# Patient Record
Sex: FEMALE | Race: WHITE | NOT HISPANIC OR LATINO | Employment: FULL TIME | URBAN - METROPOLITAN AREA
[De-identification: names, ages, dates, MRNs, and addresses within clinical notes are randomized per-mention and may not be internally consistent; named-entity substitution may affect disease eponyms.]

---

## 2023-01-30 PROBLEM — E55.9 VITAMIN D INSUFFICIENCY: Status: ACTIVE | Noted: 2023-01-30

## 2023-01-30 PROBLEM — F32.A ANXIETY AND DEPRESSION: Status: ACTIVE | Noted: 2023-01-30

## 2023-01-30 PROBLEM — M54.2 CERVICAL SPINE PAIN: Status: ACTIVE | Noted: 2023-01-30

## 2023-01-30 PROBLEM — L25.9 CONTACT DERMATITIS: Status: ACTIVE | Noted: 2023-01-30

## 2023-01-30 PROBLEM — Z86.19 HISTORY OF SHINGLES: Status: ACTIVE | Noted: 2023-01-30

## 2023-01-30 PROBLEM — J30.2 SEASONAL ALLERGIES: Status: ACTIVE | Noted: 2023-01-30

## 2023-01-30 PROBLEM — F41.9 ANXIETY AND DEPRESSION: Status: ACTIVE | Noted: 2023-01-30

## 2023-01-30 PROBLEM — R31.21 ASYMPTOMATIC MICROSCOPIC HEMATURIA: Status: ACTIVE | Noted: 2023-01-30

## 2023-01-30 PROBLEM — R03.0 BLOOD PRESSURE ELEVATED WITHOUT HISTORY OF HTN: Status: ACTIVE | Noted: 2023-01-30

## 2023-01-30 PROBLEM — I49.3 PVC (PREMATURE VENTRICULAR CONTRACTION): Status: ACTIVE | Noted: 2023-01-30

## 2023-01-30 PROBLEM — J45.20 MILD INTERMITTENT ASTHMA (HHS-HCC): Status: ACTIVE | Noted: 2023-01-30

## 2023-01-30 PROBLEM — F51.04 CHRONIC INSOMNIA: Status: ACTIVE | Noted: 2023-01-30

## 2023-01-30 PROBLEM — G35 MULTIPLE SCLEROSIS (MULTI): Status: ACTIVE | Noted: 2023-01-30

## 2023-01-30 PROBLEM — M47.816 LUMBAR SPONDYLOSIS: Status: ACTIVE | Noted: 2023-01-30

## 2023-01-30 PROBLEM — B37.2 CANDIDAL INTERTRIGO: Status: ACTIVE | Noted: 2023-01-30

## 2023-01-30 PROBLEM — R32 URINARY INCONTINENCE: Status: ACTIVE | Noted: 2023-01-30

## 2023-01-30 RX ORDER — ALBUTEROL SULFATE 90 UG/1
AEROSOL, METERED RESPIRATORY (INHALATION)
COMMUNITY
Start: 2022-10-11

## 2023-01-30 RX ORDER — BACLOFEN 10 MG/1
10 TABLET ORAL 3 TIMES DAILY PRN
COMMUNITY
Start: 2021-08-09 | End: 2023-03-06 | Stop reason: ALTCHOICE

## 2023-01-30 RX ORDER — ASPIRIN 325 MG
50000 TABLET, DELAYED RELEASE (ENTERIC COATED) ORAL
COMMUNITY
Start: 2022-09-02 | End: 2023-07-28 | Stop reason: SDUPTHER

## 2023-01-30 RX ORDER — LORAZEPAM 1 MG/1
1 TABLET ORAL 3 TIMES DAILY PRN
COMMUNITY
Start: 2021-07-07 | End: 2023-03-06 | Stop reason: SDUPTHER

## 2023-01-30 RX ORDER — FLUTICASONE PROPIONATE 50 MCG
1 SPRAY, SUSPENSION (ML) NASAL DAILY
COMMUNITY
Start: 2022-12-08 | End: 2023-07-28 | Stop reason: ALTCHOICE

## 2023-01-30 RX ORDER — ZOSTER VACCINE RECOMBINANT, ADJUVANTED 50 MCG/0.5
KIT INTRAMUSCULAR
COMMUNITY
End: 2023-02-28 | Stop reason: ENTERED-IN-ERROR

## 2023-01-30 RX ORDER — DULOXETINE 40 MG/1
40 CAPSULE, DELAYED RELEASE ORAL DAILY
COMMUNITY
Start: 2021-11-17 | End: 2023-07-28 | Stop reason: SDUPTHER

## 2023-01-30 RX ORDER — CALCIUM CARBONATE/VITAMIN D3 500-10/5ML
400 LIQUID (ML) ORAL DAILY
COMMUNITY
End: 2023-07-28 | Stop reason: ALTCHOICE

## 2023-01-30 RX ORDER — OCRELIZUMAB 300 MG/10ML
300 INJECTION INTRAVENOUS
COMMUNITY
Start: 2021-12-16

## 2023-02-05 PROBLEM — Z87.42 H/O ABNORMAL CERVICAL PAPANICOLAOU SMEAR: Status: ACTIVE | Noted: 2023-02-05

## 2023-02-05 PROBLEM — Z00.00 ROUTINE HEALTH MAINTENANCE: Status: ACTIVE | Noted: 2023-02-05

## 2023-02-05 PROBLEM — Z79.899 MEDICATION MANAGEMENT: Status: ACTIVE | Noted: 2023-02-05

## 2023-03-06 ENCOUNTER — TELEMEDICINE (OUTPATIENT)
Dept: PRIMARY CARE | Facility: CLINIC | Age: 38
End: 2023-03-06
Payer: COMMERCIAL

## 2023-03-06 DIAGNOSIS — F32.A ANXIETY AND DEPRESSION: Primary | ICD-10-CM

## 2023-03-06 DIAGNOSIS — E55.9 VITAMIN D INSUFFICIENCY: ICD-10-CM

## 2023-03-06 DIAGNOSIS — G35 MULTIPLE SCLEROSIS (MULTI): ICD-10-CM

## 2023-03-06 DIAGNOSIS — F41.9 ANXIETY AND DEPRESSION: Primary | ICD-10-CM

## 2023-03-06 DIAGNOSIS — F51.04 CHRONIC INSOMNIA: ICD-10-CM

## 2023-03-06 DIAGNOSIS — Z00.00 ROUTINE GENERAL MEDICAL EXAMINATION AT A HEALTH CARE FACILITY: ICD-10-CM

## 2023-03-06 DIAGNOSIS — Z79.899 MEDICATION MANAGEMENT: ICD-10-CM

## 2023-03-06 PROCEDURE — 99214 OFFICE O/P EST MOD 30 MIN: CPT | Performed by: INTERNAL MEDICINE

## 2023-03-06 RX ORDER — LORAZEPAM 1 MG/1
1 TABLET ORAL 3 TIMES DAILY PRN
Qty: 90 TABLET | Refills: 1 | Status: SHIPPED | OUTPATIENT
Start: 2023-03-06 | End: 2023-05-22 | Stop reason: SDUPTHER

## 2023-03-06 NOTE — PROGRESS NOTES
ES  Ran out of medication a week ago and was taking lower dose cymbalta ([facility] was shut off) last 2 days  Stress at home,  with rohith hatfield  Sleep has been nightmares  Wasn't working out consistently recently  Thought maybe shingles was coming back      Controlled Substance Visit:  I have personally reviewed the OARRS report and have considered the risks of abuse, dependence, addiction and diversion and I believe that it is clinically appropriate for the patient to be prescribed this medication.    Is the patient prescribed a combination of a benzodiazepine and opioid?  No      The last Urine Drug Screen has been reviewed and results are as expected,  and/or the UDS was ordered today if due. (06/23/2022)    Controlled Substance Agreement Date 03/06/2023  I have reviewed each line item on the Controlled Substance Agreement including, but not limited to, the benefits, risks, and alternatives to treatment with a Controlled Substance medication(s). The patient has verbalized understanding and signed the agreement.    BENZODIAZEPINES   BRIANNE -7  What is the patient’s goal of therapy? tx anxiety.  Is this being achieved with current treatment? yes.   BRIANNE-7   1. Feeling nervous, anxious or on edge- not at all  2. Not being able to stop or control worrying - several days  3. Worrying too much about different things - several days  4. Trouble relaxing - several days  5. Being so restless that it is hard to sit still - not at all  6. Becoming easily annoyed or irritable - not at all  7. Feeling afraid as if something awful might happen - not at all  Total Score = 3    Activities of Daily Living:   Is your overall impression that this patient is benefiting (symptom reduction outweighs side effects) from benzodiazepine therapy? Yes     1. Physical Functioning: Same  2. Family Relationship: Same  3. Social Relationship: Same  4. Mood: Worse  5. Sleep Patterns: Worse  6. Overall Function: Same    ROS otherwise negative aside  from what was mentioned above in HPI.    Physical Exam  Gen: Alert, NAD  HEENT:  PERRLA, EOMI, conjunctiva and sclera normal in appearance. External auditory canals/TMs normal;; Oral cavity and posterior pharynx without lesions/exudate    Problem List Items Addressed This Visit       Anxiety and depression - Primary    Overview     Started 6/2021 after dx with MS  Genesight testing 8/21: Zoloft causes moderate gene-drug interactions, Wellbutrin and Selegiline cause severe gene-drug interactions  Lexapro caused increased appetite  Prozac caused  burning mouth at 20mg  On Cymbalta  Using Ativan prn         Relevant Medications    LORazepam (Ativan) 1 mg tablet    Other Relevant Orders    TSH    Chronic insomnia    Overview     Ambien didnt work  Unisom helps for few hours  IV Benadryl worked  Trazodone may be interacting with her SSRI, stopped 9/21  Using Ativan at HS         Relevant Medications    LORazepam (Ativan) 1 mg tablet    Multiple sclerosis (CMS/HCC)    Overview     dx 6/2021   Baclofen prn and Ativan for muscle spasms and dizziness associated with her disease  On Ocrevus, started in August 2021.  [facility] manages         Relevant Orders    Magnesium    Vitamin B12    Vitamin D insufficiency    Overview     Goal ~50  On Vit D 6K daily, prefers to take weekly bc forgets daily         Relevant Orders    Vitamin D 25 hydroxy    Magnesium    Vitamin B12    Medication management    Overview     UDS, signed controlled substance contract, and OARRS check all up to date          Other Visit Diagnoses       Routine general medical examination at a health care facility        Relevant Orders    Comprehensive Metabolic Panel    CBC and Auto Differential    Lipid Panel    Vitamin D 25 hydroxy

## 2023-03-08 PROBLEM — Z00.00 ROUTINE ADULT HEALTH MAINTENANCE: Status: ACTIVE | Noted: 2023-03-08

## 2023-03-08 PROBLEM — Z00.00 ROUTINE ADULT HEALTH MAINTENANCE: Chronic | Status: ACTIVE | Noted: 2023-03-08

## 2023-03-08 PROBLEM — R32 URINARY INCONTINENCE: Status: RESOLVED | Noted: 2023-01-30 | Resolved: 2023-03-08

## 2023-04-07 ENCOUNTER — APPOINTMENT (OUTPATIENT)
Dept: LAB | Facility: LAB | Age: 38
End: 2023-04-07
Payer: COMMERCIAL

## 2023-04-07 ENCOUNTER — OFFICE VISIT (OUTPATIENT)
Dept: PRIMARY CARE | Facility: CLINIC | Age: 38
End: 2023-04-07
Payer: COMMERCIAL

## 2023-04-07 VITALS
SYSTOLIC BLOOD PRESSURE: 135 MMHG | TEMPERATURE: 97.5 F | DIASTOLIC BLOOD PRESSURE: 88 MMHG | HEART RATE: 78 BPM | WEIGHT: 193.6 LBS | BODY MASS INDEX: 28.68 KG/M2 | OXYGEN SATURATION: 98 % | HEIGHT: 69 IN

## 2023-04-07 DIAGNOSIS — R09.89 TENDER LYMPH NODE: ICD-10-CM

## 2023-04-07 DIAGNOSIS — Z00.00 ROUTINE ADULT HEALTH MAINTENANCE: Chronic | ICD-10-CM

## 2023-04-07 DIAGNOSIS — E55.9 VITAMIN D DEFICIENCY: ICD-10-CM

## 2023-04-07 DIAGNOSIS — K06.8 GINGIVAL BLEEDING: ICD-10-CM

## 2023-04-07 DIAGNOSIS — R79.89 ABNORMAL CBC: ICD-10-CM

## 2023-04-07 DIAGNOSIS — Z79.899 MEDICATION MANAGEMENT: ICD-10-CM

## 2023-04-07 DIAGNOSIS — R53.83 OTHER FATIGUE: ICD-10-CM

## 2023-04-07 DIAGNOSIS — K14.0 ABSCESS OF TONGUE: Primary | ICD-10-CM

## 2023-04-07 LAB
ALANINE AMINOTRANSFERASE (SGPT) (U/L) IN SER/PLAS: 11 U/L (ref 7–45)
ALBUMIN (G/DL) IN SER/PLAS: 4.4 G/DL (ref 3.4–5)
ALKALINE PHOSPHATASE (U/L) IN SER/PLAS: 88 U/L (ref 33–110)
ANION GAP IN SER/PLAS: 17 MMOL/L (ref 10–20)
ASPARTATE AMINOTRANSFERASE (SGOT) (U/L) IN SER/PLAS: 19 U/L (ref 9–39)
BASOPHILS (10*3/UL) IN BLOOD BY AUTOMATED COUNT: 0.09 X10E9/L (ref 0–0.1)
BASOPHILS/100 LEUKOCYTES IN BLOOD BY AUTOMATED COUNT: 2.3 % (ref 0–2)
BILIRUBIN TOTAL (MG/DL) IN SER/PLAS: 0.3 MG/DL (ref 0–1.2)
CALCIUM (MG/DL) IN SER/PLAS: 9.2 MG/DL (ref 8.6–10.3)
CARBON DIOXIDE, TOTAL (MMOL/L) IN SER/PLAS: 23 MMOL/L (ref 21–32)
CHLORIDE (MMOL/L) IN SER/PLAS: 104 MMOL/L (ref 98–107)
CHOLESTEROL (MG/DL) IN SER/PLAS: 177 MG/DL (ref 0–199)
CHOLESTEROL IN HDL (MG/DL) IN SER/PLAS: 91.9 MG/DL
CHOLESTEROL/HDL RATIO: 1.9
COBALAMIN (VITAMIN B12) (PG/ML) IN SER/PLAS: 449 PG/ML (ref 211–911)
CREATININE (MG/DL) IN SER/PLAS: 0.8 MG/DL (ref 0.5–1.05)
EBV INTERPRETATION: ABNORMAL
EOSINOPHILS (10*3/UL) IN BLOOD BY AUTOMATED COUNT: 0.15 X10E9/L (ref 0–0.7)
EOSINOPHILS/100 LEUKOCYTES IN BLOOD BY AUTOMATED COUNT: 3.8 % (ref 0–6)
EPSTEIN-BARR VCA IGG: POSITIVE
EPSTEIN-BARR VCA IGM: NEGATIVE
EPSTEIN-BARR VIRUS EARLY ANTIGEN ANTIBODY, IGG: NEGATIVE
EPSTIEN-BARR NUCLEAR ANTIGEN AB: POSITIVE
ERYTHROCYTE DISTRIBUTION WIDTH (RATIO) BY AUTOMATED COUNT: 15 % (ref 11.5–14.5)
ERYTHROCYTE MEAN CORPUSCULAR HEMOGLOBIN CONCENTRATION (G/DL) BY AUTOMATED: 33.1 G/DL (ref 32–36)
ERYTHROCYTE MEAN CORPUSCULAR VOLUME (FL) BY AUTOMATED COUNT: 88 FL (ref 80–100)
ERYTHROCYTES (10*6/UL) IN BLOOD BY AUTOMATED COUNT: 4.64 X10E12/L (ref 4–5.2)
FERRITIN (UG/LL) IN SER/PLAS: 26 UG/L (ref 8–150)
FOLATE (NG/ML) IN SER/PLAS: 12.3 NG/ML
GFR FEMALE: >90 ML/MIN/1.73M2
GLUCOSE (MG/DL) IN SER/PLAS: 82 MG/DL (ref 74–99)
HEMATOCRIT (%) IN BLOOD BY AUTOMATED COUNT: 40.8 % (ref 36–46)
HEMOGLOBIN (G/DL) IN BLOOD: 13.5 G/DL (ref 12–16)
IMMATURE GRANULOCYTES/100 LEUKOCYTES IN BLOOD BY AUTOMATED COUNT: 1.3 % (ref 0–0.9)
IRON (UG/DL) IN SER/PLAS: 27 UG/DL (ref 35–150)
IRON BINDING CAPACITY (UG/DL) IN SER/PLAS: 393 UG/DL (ref 240–445)
IRON SATURATION (%) IN SER/PLAS: 7 % (ref 25–45)
LDL: 76 MG/DL (ref 0–99)
LEUKOCYTES (10*3/UL) IN BLOOD BY AUTOMATED COUNT: 4 X10E9/L (ref 4.4–11.3)
LYMPHOCYTES (10*3/UL) IN BLOOD BY AUTOMATED COUNT: 1.99 X10E9/L (ref 1.2–4.8)
LYMPHOCYTES/100 LEUKOCYTES IN BLOOD BY AUTOMATED COUNT: 50.4 % (ref 13–44)
MAGNESIUM (MG/DL) IN SER/PLAS: 1.84 MG/DL (ref 1.6–2.4)
MONOCYTES (10*3/UL) IN BLOOD BY AUTOMATED COUNT: 1.63 X10E9/L (ref 0.1–1)
MONOCYTES/100 LEUKOCYTES IN BLOOD BY AUTOMATED COUNT: 41.3 % (ref 2–10)
NEUTROPHILS (10*3/UL) IN BLOOD BY AUTOMATED COUNT: 0.04 X10E9/L (ref 1.2–7.7)
NEUTROPHILS/100 LEUKOCYTES IN BLOOD BY AUTOMATED COUNT: 0.9 % (ref 40–80)
PLATELETS (10*3/UL) IN BLOOD AUTOMATED COUNT: 347 X10E9/L (ref 150–450)
POTASSIUM (MMOL/L) IN SER/PLAS: 4.4 MMOL/L (ref 3.5–5.3)
PROTEIN TOTAL: 7.6 G/DL (ref 6.4–8.2)
SODIUM (MMOL/L) IN SER/PLAS: 140 MMOL/L (ref 136–145)
THYROTROPIN (MIU/L) IN SER/PLAS BY DETECTION LIMIT <= 0.05 MIU/L: 1.77 MIU/L (ref 0.44–3.98)
TRIGLYCERIDE (MG/DL) IN SER/PLAS: 47 MG/DL (ref 0–149)
UREA NITROGEN (MG/DL) IN SER/PLAS: 16 MG/DL (ref 6–23)
VLDL: 9 MG/DL (ref 0–40)

## 2023-04-07 PROCEDURE — 84443 ASSAY THYROID STIM HORMONE: CPT

## 2023-04-07 PROCEDURE — 80053 COMPREHEN METABOLIC PANEL: CPT

## 2023-04-07 PROCEDURE — 82728 ASSAY OF FERRITIN: CPT

## 2023-04-07 PROCEDURE — 82746 ASSAY OF FOLIC ACID SERUM: CPT

## 2023-04-07 PROCEDURE — 80354 DRUG SCREENING FENTANYL: CPT

## 2023-04-07 PROCEDURE — 80373 DRUG SCREENING TRAMADOL: CPT

## 2023-04-07 PROCEDURE — 80346 BENZODIAZEPINES1-12: CPT

## 2023-04-07 PROCEDURE — 80368 SEDATIVE HYPNOTICS: CPT

## 2023-04-07 PROCEDURE — 1036F TOBACCO NON-USER: CPT | Performed by: NURSE PRACTITIONER

## 2023-04-07 PROCEDURE — 86665 EPSTEIN-BARR CAPSID VCA: CPT

## 2023-04-07 PROCEDURE — 83540 ASSAY OF IRON: CPT

## 2023-04-07 PROCEDURE — 80365 DRUG SCREENING OXYCODONE: CPT

## 2023-04-07 PROCEDURE — 80061 LIPID PANEL: CPT

## 2023-04-07 PROCEDURE — 80358 DRUG SCREENING METHADONE: CPT

## 2023-04-07 PROCEDURE — 83550 IRON BINDING TEST: CPT

## 2023-04-07 PROCEDURE — 80307 DRUG TEST PRSMV CHEM ANLYZR: CPT

## 2023-04-07 PROCEDURE — 80361 OPIATES 1 OR MORE: CPT

## 2023-04-07 PROCEDURE — 36415 COLL VENOUS BLD VENIPUNCTURE: CPT | Performed by: NURSE PRACTITIONER

## 2023-04-07 PROCEDURE — 83735 ASSAY OF MAGNESIUM: CPT

## 2023-04-07 PROCEDURE — 82652 VIT D 1 25-DIHYDROXY: CPT

## 2023-04-07 PROCEDURE — 85060 BLOOD SMEAR INTERPRETATION: CPT | Performed by: PATHOLOGY

## 2023-04-07 PROCEDURE — 86664 EPSTEIN-BARR NUCLEAR ANTIGEN: CPT

## 2023-04-07 PROCEDURE — 85025 COMPLETE CBC W/AUTO DIFF WBC: CPT

## 2023-04-07 PROCEDURE — 99214 OFFICE O/P EST MOD 30 MIN: CPT | Performed by: NURSE PRACTITIONER

## 2023-04-07 PROCEDURE — 82607 VITAMIN B-12: CPT

## 2023-04-07 PROCEDURE — 86663 EPSTEIN-BARR ANTIBODY: CPT

## 2023-04-07 RX ORDER — PREDNISONE 20 MG/1
40 TABLET ORAL DAILY
Qty: 6 TABLET | Refills: 0 | Status: SHIPPED | OUTPATIENT
Start: 2023-04-07 | End: 2023-04-10

## 2023-04-07 RX ORDER — AMOXICILLIN AND CLAVULANATE POTASSIUM 875; 125 MG/1; MG/1
875 TABLET, FILM COATED ORAL 2 TIMES DAILY
Qty: 14 TABLET | Refills: 0 | Status: SHIPPED | OUTPATIENT
Start: 2023-04-07 | End: 2023-04-14

## 2023-04-07 NOTE — PROGRESS NOTES
"Subjective   Patient ID: Andreina Gil is a 37 y.o. female who presents for sick visit (Onset/3-4 days ago//Sx/Tongue infection/Started as swollen taste bud she messed with it and got worse/Gums are also bleeding painful to brush teeth).  HPI  Squeezed with tweezers  - nothing came out   First noticed inflamed taste bud tip of her tongue  Now a bump underneath  No drainage  White with surrounding erythema  Getting larger  Bump\"in\" her tongue not on top of her tongue  L lymphs painful now bilat  Uncomfortable to touch   Hard to talk  Last dental 12/22  No loss of sense of taste    No recent fever  This morning feels cough coming on  No sore throat  No ear pain  Nose very dry  - little blood  No cough with blood  No rash   No weight loss  Last 2-3 night  - waking up drenched during the night  - using a fan    Review of Systems   All other systems reviewed and are negative.      BP Readings from Last 3 Encounters:   04/07/23 135/88   06/06/22 (!) 132/92   08/09/21 118/76      Wt Readings from Last 3 Encounters:   04/07/23 87.8 kg (193 lb 9.6 oz)   12/08/22 81.6 kg (180 lb)   06/06/22 85.3 kg (188 lb)      BMI:   Estimated body mass index is 28.59 kg/m² as calculated from the following:    Height as of this encounter: 1.753 m (5' 9\").    Weight as of this encounter: 87.8 kg (193 lb 9.6 oz).    Objective   Physical Exam  Constitutional:       Appearance: Normal appearance.   HENT:      Head: Normocephalic and atraumatic.      Right Ear: Ear canal and external ear normal.      Left Ear: Tympanic membrane, ear canal and external ear normal.      Ears:      Comments: R TM vascularity      Nose: Nose normal.      Mouth/Throat:      Mouth: Mucous membranes are moist.      Pharynx: Oropharynx is clear.      Comments: Tip of tongue with single raised circular region  No appreciable mass, though tongue area around tongue feels full  Eyes:      Extraocular Movements: Extraocular movements intact.      Conjunctiva/sclera: " Conjunctivae normal.   Neck:      Comments: Bilat submandibular nodes tender   Non palpable enlargement  Cardiovascular:      Rate and Rhythm: Normal rate and regular rhythm.   Pulmonary:      Effort: Pulmonary effort is normal.      Breath sounds: Normal breath sounds.   Abdominal:      Palpations: Abdomen is soft.   Musculoskeletal:         General: Normal range of motion.      Cervical back: Normal range of motion and neck supple.   Skin:     General: Skin is warm and dry.   Neurological:      General: No focal deficit present.      Mental Status: She is alert.   Psychiatric:         Mood and Affect: Mood normal.         Assessment/Plan   Problem List Items Addressed This Visit       Medication management    Relevant Orders    Opiate/Opioid/Benzo Extended Prescription Compliance    Routine adult health maintenance (Chronic)    Relevant Orders    Comprehensive Metabolic Panel    CBC and Auto Differential    Lipid Panel    TSH with reflex to Free T4 if abnormal    Vitamin D deficiency     Check level          Relevant Orders    Vitamin D 1,25 Dihydroxy     Other Visit Diagnoses       Abscess of tongue    -  Primary    augmentin  steroid burst 40 mg qd X 3  if persists fu with ENT    Relevant Medications    amoxicillin-pot clavulanate (Augmentin) 875-125 mg tablet    predniSONE (Deltasone) 20 mg tablet    Other Relevant Orders    Referral to ENT    Comprehensive Metabolic Panel    CBC and Auto Differential    Other fatigue        additional labs ordered    Relevant Orders    Comprehensive Metabolic Panel    CBC and Auto Differential    TSH with reflex to Free T4 if abnormal    Magnesium    Vitamin B12    Iron and TIBC    Ferritin    Folate    Paul-Barr Virus Antibody Panel    Gingival bleeding        likely reactive    Relevant Medications    amoxicillin-pot clavulanate (Augmentin) 875-125 mg tablet    predniSONE (Deltasone) 20 mg tablet    Other Relevant Orders    Referral to ENT    Comprehensive Metabolic Panel     CBC and Auto Differential    Tender lymph node        likely reactive    Relevant Medications    amoxicillin-pot clavulanate (Augmentin) 875-125 mg tablet    predniSONE (Deltasone) 20 mg tablet    Other Relevant Orders    Referral to ENT    Comprehensive Metabolic Panel    CBC and Auto Differential

## 2023-04-10 LAB
CBC DIFFERENTIAL PATH REVIEW: NORMAL
VITAMIN D 1,25-DIHYDROXY: 82.3 PG/ML (ref 19.9–79.3)

## 2023-04-10 RX ORDER — FERROUS SULFATE 325(65) MG
65 TABLET ORAL
COMMUNITY
End: 2023-07-28 | Stop reason: ALTCHOICE

## 2023-04-11 LAB
6-ACETYLMORPHINE: <25 NG/ML
7-AMINOCLONAZEPAM: <25 NG/ML
ALPHA-HYDROXYALPRAZOLAM: >1000 NG/ML
ALPHA-HYDROXYMIDAZOLAM: <25 NG/ML
ALPRAZOLAM: 681 NG/ML
AMPHETAMINE (PRESENCE) IN URINE BY SCREEN METHOD: ABNORMAL
BARBITURATES PRESENCE IN URINE BY SCREEN METHOD: ABNORMAL
CANNABINOIDS IN URINE BY SCREEN METHOD: ABNORMAL
CHLORDIAZEPOXIDE: <25 NG/ML
CLONAZEPAM: <25 NG/ML
COCAINE (PRESENCE) IN URINE BY SCREEN METHOD: ABNORMAL
CODEINE: <50 NG/ML
CREATINE, URINE FOR DRUG: 405.3 MG/DL
DIAZEPAM: <25 NG/ML
DRUG SCREEN COMMENT URINE: ABNORMAL
EDDP: <25 NG/ML
FENTANYL CONFIRMATION, URINE: <2.5 NG/ML
HYDROCODONE: <25 NG/ML
HYDROMORPHONE: <25 NG/ML
LORAZEPAM: >1000 NG/ML
METHADONE CONFIRMATION,URINE: <25 NG/ML
MIDAZOLAM: <25 NG/ML
MORPHINE URINE: <50 NG/ML
NORDIAZEPAM: <25 NG/ML
NORFENTANYL: <2.5 NG/ML
NORHYDROCODONE: <25 NG/ML
NOROXYCODONE: <25 NG/ML
O-DESMETHYLTRAMADOL: <50 NG/ML
OXAZEPAM: <25 NG/ML
OXYCODONE: <25 NG/ML
OXYMORPHONE: <25 NG/ML
PHENCYCLIDINE (PRESENCE) IN URINE BY SCREEN METHOD: ABNORMAL
TEMAZEPAM: <25 NG/ML
TRAMADOL: <50 NG/ML
ZOLPIDEM METABOLITE (ZCA): <25 NG/ML
ZOLPIDEM: <25 NG/ML

## 2023-05-22 DIAGNOSIS — F51.04 CHRONIC INSOMNIA: ICD-10-CM

## 2023-05-22 DIAGNOSIS — F32.A ANXIETY AND DEPRESSION: ICD-10-CM

## 2023-05-22 DIAGNOSIS — F41.9 ANXIETY AND DEPRESSION: ICD-10-CM

## 2023-05-22 RX ORDER — LORAZEPAM 1 MG/1
1 TABLET ORAL 3 TIMES DAILY PRN
Qty: 90 TABLET | Refills: 1 | Status: SHIPPED | OUTPATIENT
Start: 2023-05-22 | End: 2023-07-28 | Stop reason: SDUPTHER

## 2023-06-07 ENCOUNTER — APPOINTMENT (OUTPATIENT)
Dept: PRIMARY CARE | Facility: CLINIC | Age: 38
End: 2023-06-07
Payer: COMMERCIAL

## 2023-06-13 ENCOUNTER — APPOINTMENT (OUTPATIENT)
Dept: PRIMARY CARE | Facility: CLINIC | Age: 38
End: 2023-06-13
Payer: COMMERCIAL

## 2023-06-25 DIAGNOSIS — K04.7 TOOTH INFECTION: Primary | ICD-10-CM

## 2023-06-25 RX ORDER — AMOXICILLIN AND CLAVULANATE POTASSIUM 875; 125 MG/1; MG/1
875 TABLET, FILM COATED ORAL 2 TIMES DAILY
Qty: 14 TABLET | Refills: 0 | Status: SHIPPED | OUTPATIENT
Start: 2023-06-25 | End: 2023-07-02

## 2023-07-26 ASSESSMENT — PROMIS GLOBAL HEALTH SCALE
RATE_GENERAL_HEALTH: VERY GOOD
RATE_AVERAGE_FATIGUE: MODERATE
CARRYOUT_PHYSICAL_ACTIVITIES: COMPLETELY
RATE_QUALITY_OF_LIFE: VERY GOOD
CARRYOUT_SOCIAL_ACTIVITIES: VERY GOOD
RATE_PHYSICAL_HEALTH: VERY GOOD
EMOTIONAL_PROBLEMS: SOMETIMES
RATE_SOCIAL_SATISFACTION: GOOD
RATE_MENTAL_HEALTH: GOOD
RATE_AVERAGE_PAIN: 2

## 2023-07-28 ENCOUNTER — OFFICE VISIT (OUTPATIENT)
Dept: PRIMARY CARE | Facility: CLINIC | Age: 38
End: 2023-07-28
Payer: COMMERCIAL

## 2023-07-28 VITALS
HEART RATE: 92 BPM | OXYGEN SATURATION: 98 % | WEIGHT: 202 LBS | BODY MASS INDEX: 28.92 KG/M2 | HEIGHT: 70 IN | TEMPERATURE: 98.1 F

## 2023-07-28 DIAGNOSIS — Z79.899 MEDICATION MANAGEMENT: ICD-10-CM

## 2023-07-28 DIAGNOSIS — F51.04 CHRONIC INSOMNIA: ICD-10-CM

## 2023-07-28 DIAGNOSIS — E55.9 VITAMIN D DEFICIENCY: ICD-10-CM

## 2023-07-28 DIAGNOSIS — E61.1 IRON DEFICIENCY: ICD-10-CM

## 2023-07-28 DIAGNOSIS — F41.9 ANXIETY AND DEPRESSION: ICD-10-CM

## 2023-07-28 DIAGNOSIS — F32.A ANXIETY AND DEPRESSION: ICD-10-CM

## 2023-07-28 DIAGNOSIS — G35 MULTIPLE SCLEROSIS (MULTI): ICD-10-CM

## 2023-07-28 DIAGNOSIS — Z00.00 ROUTINE ADULT HEALTH MAINTENANCE: Primary | Chronic | ICD-10-CM

## 2023-07-28 PROBLEM — B37.2 CANDIDAL INTERTRIGO: Status: RESOLVED | Noted: 2023-01-30 | Resolved: 2023-07-28

## 2023-07-28 PROCEDURE — 99395 PREV VISIT EST AGE 18-39: CPT | Performed by: INTERNAL MEDICINE

## 2023-07-28 PROCEDURE — 3008F BODY MASS INDEX DOCD: CPT | Performed by: INTERNAL MEDICINE

## 2023-07-28 PROCEDURE — 1036F TOBACCO NON-USER: CPT | Performed by: INTERNAL MEDICINE

## 2023-07-28 PROCEDURE — 99214 OFFICE O/P EST MOD 30 MIN: CPT | Performed by: INTERNAL MEDICINE

## 2023-07-28 RX ORDER — SEMAGLUTIDE 0.25 MG/.5ML
0.25 INJECTION, SOLUTION SUBCUTANEOUS
Qty: 2 ML | Refills: 0 | Status: SHIPPED | OUTPATIENT
Start: 2023-07-28 | End: 2023-08-01 | Stop reason: SDUPTHER

## 2023-07-28 RX ORDER — ASPIRIN 325 MG
50000 TABLET, DELAYED RELEASE (ENTERIC COATED) ORAL
Qty: 12 CAPSULE | Refills: 3 | Status: SHIPPED | OUTPATIENT
Start: 2023-07-28

## 2023-07-28 RX ORDER — TIZANIDINE 2 MG/1
2 TABLET ORAL 2 TIMES DAILY PRN
COMMUNITY
End: 2024-01-16 | Stop reason: ALTCHOICE

## 2023-07-28 RX ORDER — DULOXETINE 40 MG/1
40 CAPSULE, DELAYED RELEASE ORAL DAILY
Qty: 90 CAPSULE | Refills: 3 | Status: SHIPPED | OUTPATIENT
Start: 2023-07-28

## 2023-07-28 RX ORDER — LORAZEPAM 1 MG/1
1 TABLET ORAL 3 TIMES DAILY PRN
Qty: 90 TABLET | Refills: 1 | Status: SHIPPED | OUTPATIENT
Start: 2023-07-28 | End: 2023-10-16 | Stop reason: SDUPTHER

## 2023-07-28 ASSESSMENT — PATIENT HEALTH QUESTIONNAIRE - PHQ9
2. FEELING DOWN, DEPRESSED OR HOPELESS: SEVERAL DAYS
1. LITTLE INTEREST OR PLEASURE IN DOING THINGS: NOT AT ALL
SUM OF ALL RESPONSES TO PHQ9 QUESTIONS 1 AND 2: 1
10. IF YOU CHECKED OFF ANY PROBLEMS, HOW DIFFICULT HAVE THESE PROBLEMS MADE IT FOR YOU TO DO YOUR WORK, TAKE CARE OF THINGS AT HOME, OR GET ALONG WITH OTHER PEOPLE: SOMEWHAT DIFFICULT

## 2023-07-28 NOTE — PROGRESS NOTES
Assessment and Plan:  Problem List Items Addressed This Visit          High    Routine adult health maintenance - Primary (Chronic)    Overview     COVID-19 vaccine 3/10/2021, 2/17/2021, 9/24/21, 6/2/22, 12/18/22  Hepatitis B Peds/Adol12/29/1997, 5/16/1997, 4/4/1997  Influenza Vaccine, Split-Non Spec10/3/2018  MMR4/4/1997  Tdap (Age 7+)1/22/2019, 6/1/2012  Varicella Vaccine1/8/1998  PAP/HPV 4/11/17: LGSIL/HPV(+), 7/31/18: normal PAP, HPV(+),  8/19/19 LGSIL/HPV(+),  9/28/20: ASCUS, HPV (-);   8/11/21 (-)  Hep C Ab (-) 6/21  BCG8/24/2011         Current Assessment & Plan     Annual Wellness exam completed   Preventive Health history reviewed:  Vaccines today: none  Labs ordered     PAP scheduled  Depression Screening done            Medium    Anxiety and depression    Overview     Started 6/2021 after dx with MS  Genesight testing 8/21: Zoloft causes moderate gene-drug interactions, Wellbutrin and Selegiline cause severe gene-drug interactions  Lexapro caused increased appetite  Prozac caused  burning mouth at 20mg  On Cymbalta  Using Ativan 3x/day         Relevant Medications    LORazepam (Ativan) 1 mg tablet    DULoxetine 40 mg DR capsule    Other Relevant Orders    T4, free    T3, free    BMI 29.0-29.9,adult    Overview     Discussed exercise/PT and diet           Current Assessment & Plan     Will try Wegovy         Relevant Medications    semaglutide, weight loss, (Wegovy) 0.25 mg/0.5 mL pen injector    Chronic insomnia    Overview     Ambien didnt work  MeeWee helps for few hours  IV Benadryl worked  Trazodone may be interacting with her SSRI, stopped 9/21  Using Ativan at HS         Relevant Medications    LORazepam (Ativan) 1 mg tablet    Medication management    Overview     UDS, signed controlled substance contract, and OARRS check all up to date         Multiple sclerosis (CMS/HCC)    Overview     dx 6/2021   Baclofen prn and Ativan for muscle spasms and dizziness associated with her disease  On Ocrevus,  started in August 2021.  [facility] manages         Relevant Orders    Magnesium    Basic metabolic panel    Vitamin D deficiency    Overview     Goal ~50  On Vit D 6K daily, prefers to take weekly bc forgets daily         Relevant Medications    cholecalciferol (Vitamin D-3) 1,250 mcg (50,000 unit) capsule    Other Relevant Orders    Vitamin D 25-Hydroxy,Total     Other Visit Diagnoses       Iron deficiency        Not on supplement  will check level again    Relevant Orders    Iron and TIBC    CBC and Auto Differential          HPI: Getting achy pain in thighs L>R  Muscles and joints hurt  Minutes to feel better in AM  Spasma have decreased  Wants to try wegovy for weight loss  Labs reviewed:  Iron low in April    Labs reviewed:  Component      Latest Ref Rng 4/7/2023   WBC      4.4 - 11.3 x10E9/L 4.0 (L)    RBC      4.00 - 5.20 x10E12/L 4.64    HEMOGLOBIN      12.0 - 16.0 g/dL 13.5    HEMATOCRIT      36.0 - 46.0 % 40.8    MCV      80 - 100 fL 88    MCHC      32.0 - 36.0 g/dL 33.1    Platelets      150 - 450 x10E9/L 347    RED CELL DISTRIBUTION WIDTH      11.5 - 14.5 % 15.0 (H)    Neutrophils %      40.0 - 80.0 % 0.9    Immature Granulocytes %, Automated      0.0 - 0.9 % 1.3 (H)    Lymphocytes %      13.0 - 44.0 % 50.4    Monocytes %      2.0 - 10.0 % 41.3    Eosinophils %      0.0 - 6.0 % 3.8    Basophils %      0.0 - 2.0 % 2.3    Neutrophils Absolute      1.20 - 7.70 x10E9/L 0.04 (L)    Lymphocytes Absolute      1.20 - 4.80 x10E9/L 1.99    Monocytes Absolute      0.10 - 1.00 x10E9/L 1.63 (H)    Eosinophils Absolute      0.00 - 0.70 x10E9/L 0.15    Basophils Absolute      0.00 - 0.10 x10E9/L 0.09    GLUCOSE      74 - 99 mg/dL 82    SODIUM      136 - 145 mmol/L 140    POTASSIUM      3.5 - 5.3 mmol/L 4.4    CHLORIDE      98 - 107 mmol/L 104    Bicarbonate      21 - 32 mmol/L 23    Anion Gap      10 - 20 mmol/L 17    Blood Urea Nitrogen      6 - 23 mg/dL 16    Creatinine      0.50 - 1.05 mg/dL 0.80    GFR Female    "   >90 mL/min/1.73m2 >90    Calcium      8.6 - 10.3 mg/dL 9.2    Albumin      3.4 - 5.0 g/dL 4.4    Alkaline Phosphatase      33 - 110 U/L 88    Total Protein      6.4 - 8.2 g/dL 7.6    AST      9 - 39 U/L 19    Bilirubin Total      0.0 - 1.2 mg/dL 0.3    ALT      7 - 45 U/L 11    CHOLESTEROL      0 - 199 mg/dL 177    HDL CHOLESTEROL      mg/dL 91.9    Cholesterol/HDL Ratio 1.9    LDL      0 - 99 mg/dL 76    VLDL      0 - 40 mg/dL 9    TRIGLYCERIDES      0 - 149 mg/dL 47    IRON      35 - 150 ug/dL 27 (L)    TIBC      240 - 445 ug/dL 393    % Saturation      25 - 45 % 7 (L)    Thyroid Stimulating Hormone      0.44 - 3.98 mIU/L 1.77    Vit D, 1,25-Dihydroxy      19.9 - 79.3 pg/mL 82.3 (H)    MAGNESIUM      1.60 - 2.40 mg/dL 1.84    Vitamin B12      211 - 911 pg/mL 449    FERRITIN      8 - 150 ug/L 26    FOLATE      >5.0 ng/mL 12.3      Saw GYN (Copied):  ASSESSMENT/PLAN:  1. Breakthrough bleeding with IUD - ICD9: 626.6, V45.51, ICD10: N92.1, Z97.5    Patient counseled about the mid cycle spotting which can be expected and as long as it's only spotting for 2-3 days that is not interfering with her daily activities there is no need for intervention. Pain also can be attributed to ovulation, as the pain has been resolved there is no need for further intervention.  Patient agreed, she will track her spotting and pain over the next few cycles and see.  She is also planning on pregnancy and would like her IUD to be removed in July.       ROS otherwise negative aside from what was mentioned above in HPI.    Vitals  Pulse 92   Temp 36.7 °C (98.1 °F)   Ht 1.772 m (5' 9.75\")   Wt 91.6 kg (202 lb)   SpO2 98%   BMI 29.19 kg/m²   Body mass index is 29.19 kg/m².  Physical Exam  Gen: Alert, NAD  HEENT:  Normal exam  Neck:  No masses/nodes palpable; Thyroid nontender and without nodules; No ERASMO  Respiratory:  Lungs CTAB  CV: RRR  Neuro:  Gross motor and sensory intact  Skin:  No suspicious lesions present  Breast: No masses or " axillary lymphadenopathy  Gyn: Deferred    Active Problem List  Patient Active Problem List   Diagnosis    Anxiety and depression    Asymptomatic microscopic hematuria    Blood pressure elevated without history of HTN    Cervical spine pain    Chronic insomnia    Contact dermatitis    History of shingles    Lumbar spondylosis    Mild intermittent asthma    Multiple sclerosis (CMS/HCC)    PVC (premature ventricular contraction)    Seasonal allergies    Vitamin D deficiency    BMI 29.0-29.9,adult    H/O abnormal cervical Papanicolaou smear    Medication management    Routine adult health maintenance       Comprehensive Medical/Surgical/Social/Family History  Past Medical History:   Diagnosis Date    H/O echocardiogram     2018: 1. Normal LV size and systolic function.  2. Normal RV size and systolic function.  3. Trivial TR and PI    H/O magnetic resonance imaging of brain and brain stem     : MR/MRA/MRVI of the brain shows multiple nonspecific small/punctate T2/FLAIR hyperintense foci cerebral white matter, several of which are in the periventricular region, where they are oriented along the parapharyngeal spaces, as is commonly seen with (mild to moderate burden) a demyelinating process/possible multiple sclerosis    H/O magnetic resonance imaging of cervical spine     : Single small intramedullary signal abnormality at C5 level likely represents demyelinating plaque given intracranial findings. Remaining visualized cord demonstrates normal morhology and signal intensity.  No significant canal or foraminal stenosis.    H/O x-ray of lumbar spine     : normal     Past Surgical History:   Procedure Laterality Date    CERVICAL BIOPSY  W/ LOOP ELECTRODE EXCISION       SECTION, CLASSIC      COLONOSCOPY      age 24 y, [place] ?    COLPOSCOPY      2017: FINAL DIAGNOSIS 1. Endocervix, curettage (A) - Minute strips of benign  endocervical epithelium. 2. Endocervix, 12 o'clock, biopsy (B) - Benign  squamous  mucosa, negative for dysplasia. BY/[facility]/rw 2017    OTHER SURGICAL HISTORY  2021    Colonoscopy    OTHER SURGICAL HISTORY  2021    Loop electrosurgical excision procedure    OTHER SURGICAL HISTORY  2021    Tonsillectomy    OTHER SURGICAL HISTORY  2021    Plush tooth extraction    OTHER SURGICAL HISTORY  2021     section    OTHER SURGICAL HISTORY  2021    Colposcopy    TONSILLECTOMY      WISDOM TOOTH EXTRACTION       Social History     Social History Narrative    Social History:    Marries, 2 kids    Works as Mental Health Therapist    Nonsmoker, Social ETOH        Family History:    M: HTN, Emphysema, trigeminal neuralgia, osteopenia    F: Bipolar, OCD, Anxiety/Depression, HTN, recovered ETOH, bladder CA, cataracts, colon stricture/resection, stomach aneurysm, osteoporosis    S: Depression, Anxiety, HTN, migraine    S: Depression, Anxiety, OCD, HTN, ETOH, Macular Degeneration, Anemia, osteopenia, asthma, migraine    PGF: CHF    PGGM: Parkinson's    PGA: Parkinson's    PGM: angina, alzhemiers, colon CA, RA, skin CA, depression    P1/2U: brain tumor ()    PU: migraine    MGGF: RA    MGM: T2D, HTN, COPD, CHF, stroke, migraine, kidney disease       Allergies and Medications  Cat dander, Escitalopram oxalate, House dust, and Sertraline  Current Outpatient Medications   Medication Instructions    albuterol 90 mcg/actuation inhaler inhalation    cholecalciferol (VITAMIN D-3) 50,000 Units, oral, Weekly    DULoxetine 40 mg, oral, Daily    LORazepam (ATIVAN) 1 mg, oral, 3 times daily PRN    Ocrevus 300 mg, intravenous, Infuse every 6 months    tiZANidine (ZANAFLEX) 2 mg, oral, 2 times daily PRN    Wegovy 0.25 mg, subcutaneous, Every 7 days     Controlled Substance Visit:  I have personally reviewed the OARRS report and have considered the risks of abuse, dependence, addiction and diversion and I believe that it is clinically appropriate for the patient to be prescribed  this(these) medication(s).    Is the patient prescribed a combination of a benzodiazepine and opioid? no     The last Urine Drug Screen has been reviewed and results are as expected,  and/or the UDS was ordered today if due.  Recent Results (from the past 82432 hour(s))   OPIATE/OPIOID/BENZO PRESCRIPTION COMPLIANCE    Collection Time: 04/07/23 11:56 AM   Result Value Ref Range    DRUG SCREEN COMMENT URINE SEE BELOW     Creatine, Urine 405.3 mg/dL    Amphetamine Screen, Urine PRESUMPTIVE NEGATIVE NEGATIVE    Barbiturate Screen, Urine PRESUMPTIVE NEGATIVE NEGATIVE    Cannabinoid Screen, Urine PRESUMPTIVE NEGATIVE NEGATIVE    Cocaine Screen, Urine PRESUMPTIVE NEGATIVE NEGATIVE    PCP Screen, Urine PRESUMPTIVE NEGATIVE NEGATIVE    7-Aminoclonazepam <25 Cutoff <25 ng/mL    Alpha-Hydroxyalprazolam >1000 (A) Cutoff <25 ng/mL    Alpha-Hydroxymidazolam <25 Cutoff <25 ng/mL    Alprazolam 681 (A) Cutoff <25 ng/mL    Chlordiazepoxide <25 Cutoff <25 ng/mL    Clonazepam <25 Cutoff <25 ng/mL    Diazepam <25 Cutoff <25 ng/mL    Lorazepam >1000 (A) Cutoff <25 ng/mL    Midazolam <25 Cutoff <25 ng/mL    Nordiazepam <25 Cutoff <25 ng/mL    Oxazepam <25 Cutoff <25 ng/mL    Temazepam <25 Cutoff <25 ng/mL    Zolpidem <25 Cutoff <25 ng/mL    Zolpidem Metabolite (ZCA) <25 Cutoff <25 ng/mL    6-Acetylmorphine <25 Cutoff <25 ng/mL    Codeine <50 Cutoff <50 ng/mL    Hydrocodone <25 Cutoff <25 ng/mL    Hydromorphone <25 Cutoff <25 ng/mL    Morphine Urine <50 Cutoff <50 ng/mL    Norhydrocodone <25 Cutoff <25 ng/mL    Noroxycodone <25 Cutoff <25 ng/mL    Oxycodone <25 Cutoff <25 ng/mL    Oxymorphone <25 Cutoff <25 ng/mL    Tramadol <50 Cutoff <50 ng/mL    O-Desmethyltramadol <50 Cutoff <50 ng/mL    Fentanyl <2.5 Cutoff<2.5 ng/mL    Norfentanyl <2.5 Cutoff<2.5 ng/mL    METHADONE CONFIRMATION,URINE <25 Cutoff <25 ng/mL    EDDP <25 Cutoff <25 ng/mL   Benzodiazepine Confirmation, Urine    Collection Time: 07/07/21  9:26 AM   Result Value Ref Range     7-Aminoclonazepam <25 Cutoff <25 ng/mL    Alpha-Hydroxyalprazolam 169 (A) Cutoff <25 ng/mL    Alpha-Hydroxymidazolam <25 Cutoff <25 ng/mL    Alprazolam 110 (A) Cutoff <25 ng/mL    Chlordiazepoxide <25 Cutoff <25 ng/mL    Clonazepam <25 Cutoff <25 ng/mL    Diazepam <25 Cutoff <25 ng/mL    Lorazepam <25 Cutoff <25 ng/mL    Midazolam <25 Cutoff <25 ng/mL    Nordiazepam <25 Cutoff <25 ng/mL    Oxazepam <25 Cutoff <25 ng/mL    Temazepam <25 Cutoff <25 ng/mL   Drug Screen, Urine With Reflex to Confirmation    Collection Time: 07/07/21  9:26 AM   Result Value Ref Range    DRUG SCREEN COMMENT URINE SEE BELOW     Amphetamine Screen, Urine PRESUMPTIVE NEGATIVE NEGATIVE    Barbiturate Screen, Urine PRESUMPTIVE NEGATIVE NEGATIVE    BENZODIAZEPINE (PRESENCE) IN URINE BY SCREEN METHOD PRESUMPTIVE POSITIVE (A) NEGATIVE    Cannabinoid Screen, Urine PRESUMPTIVE NEGATIVE NEGATIVE    Cocaine Screen, Urine PRESUMPTIVE NEGATIVE NEGATIVE    Fentanyl, Ur PRESUMPTIVE NEGATIVE NEGATIVE    Methadone Screen, Urine PRESUMPTIVE NEGATIVE NEGATIVE    Opiate Screen, Urine PRESUMPTIVE NEGATIVE NEGATIVE    Oxycodone Screen, Ur PRESUMPTIVE POSITIVE (A) NEGATIVE    PCP Screen, Urine PRESUMPTIVE NEGATIVE NEGATIVE   Opiate Confirmation, Urine    Collection Time: 07/07/21  9:26 AM   Result Value Ref Range    6-Acetylmorphine <25 Cutoff <25 ng/mL    Codeine <50 Cutoff <50 ng/mL    Hydrocodone <25 Cutoff <25 ng/mL    Hydromorphone <25 Cutoff <25 ng/mL    Morphine Urine <50 Cutoff <50 ng/mL    Norhydrocodone <25 Cutoff <25 ng/mL    Noroxycodone <25 Cutoff <25 ng/mL    Oxycodone <25 Cutoff <25 ng/mL    Oxymorphone <25 Cutoff <25 ng/mL       Controlled Substance Agreement Date 03/23/2023  I have reviewed each line item on the Controlled Substance Agreement including, but not limited to, the benefits, risks, and alternatives to treatment with a Controlled Substance medication(s). The patient has verbalized understanding and signed the  agreement.    Benzodiazepines:  What is the patient's goal of therapy? To help with anxiety and sleep  Is this being achieved with current treatment? no  BRIANNE-7   1. Feeling nervous, anxious or on edge: 1 - Several days  2. Not being able to stop or control worryin - Several days  3. Worrying too much about different things: 1 - Several days  4. Trouble relaxin - Several days  5. Being so restless that it is hard to sit still: 1 - Several days  6. Becoming easily annoyed or irritable: 2 - More than half the days  7. Feeling afraid as if something awful might happen: 0 - Not at all  Total Score = 7    Activities of Daily Living:   Is your overall impression that this patient is benefiting (symptom reduction outweighs side effects) from therapy? Yes     1. Physical Functioning: Same  2. Family Relationship: Same  3. Social Relationship: Same  4. Mood: Same  5. Sleep Patterns: Same  6. Overall Function: SameReason for Visit: Annual Physical Exam

## 2023-07-28 NOTE — ASSESSMENT & PLAN NOTE
Annual Wellness exam completed   Preventive Health history reviewed:  Vaccines today: none  Labs ordered     PAP scheduled  Depression Screening done

## 2023-08-01 RX ORDER — SEMAGLUTIDE 0.25 MG/.5ML
0.25 INJECTION, SOLUTION SUBCUTANEOUS
Qty: 2 ML | Refills: 0 | Status: SHIPPED | OUTPATIENT
Start: 2023-08-01 | End: 2023-08-14 | Stop reason: SDUPTHER

## 2023-08-14 RX ORDER — SEMAGLUTIDE 0.25 MG/.5ML
0.25 INJECTION, SOLUTION SUBCUTANEOUS
Qty: 2 ML | Refills: 0 | Status: SHIPPED | OUTPATIENT
Start: 2023-08-14 | End: 2023-10-27 | Stop reason: SDUPTHER

## 2023-08-22 DIAGNOSIS — U07.1 COVID-19: Primary | ICD-10-CM

## 2023-09-29 ENCOUNTER — TELEMEDICINE (OUTPATIENT)
Dept: PRIMARY CARE | Facility: CLINIC | Age: 38
End: 2023-09-29
Payer: COMMERCIAL

## 2023-09-29 VITALS — HEIGHT: 69 IN | BODY MASS INDEX: 29.18 KG/M2 | WEIGHT: 197 LBS

## 2023-09-29 DIAGNOSIS — R03.0 BLOOD PRESSURE ELEVATED WITHOUT HISTORY OF HTN: ICD-10-CM

## 2023-09-29 PROCEDURE — 99214 OFFICE O/P EST MOD 30 MIN: CPT | Performed by: NURSE PRACTITIONER

## 2023-09-29 RX ORDER — SEMAGLUTIDE 0.5 MG/.5ML
0.5 INJECTION, SOLUTION SUBCUTANEOUS
Qty: 2 ML | Refills: 0 | Status: SHIPPED | OUTPATIENT
Start: 2023-09-29 | End: 2023-10-27

## 2023-09-29 NOTE — PROGRESS NOTES
Problem List Items Addressed This Visit       Blood pressure elevated without history of HTN    Overview     9/6/22 Cuff Comparison  Office cuff -  124/87   hr 84  Home cuff -   129/85   hr 85         Relevant Medications    semaglutide, weight loss, (Wegovy) 0.5 mg/0.5 mL pen injector    BMI 29.0-29.9,adult - Primary    Overview     Discussed exercise/PT and diet  MS provider cleared her for semaglutide  No hx pancreatitis, personal/family hx MTC  9/29/23: start wegovy 0.25 mg qw after she gets Dr Cha labs done. Plan to recheck labs at 3 months, including pancreas            Current Assessment & Plan     R/B/SE semaglutide discussed with patient. Red flags discussed. She agrees to start this medication         Relevant Medications    semaglutide, weight loss, (Wegovy) 0.5 mg/0.5 mL pen injector    Other Relevant Orders    Lipase    Basic metabolic panel    Lipase    Amylase      An interactive audio/visual telecommunication system which permits real time communications between the patient (at the originating site) and provider (at the distant site) was utilized to provide this telehealth service.    Verbal consent was requested and obtained from the patient for this telehealth visit.  We have confirmed the patient wishes to see me, Christie Prather, a board certified family nurse practitioner with an active [facility] license as well as verified the patient's identity and physical location in [place].    Subjective   Patient ID: Andreina Gil is a 38 y.o. female who presents for No chief complaint on file..  HPI  Body mass index is 29.09 kg/m².  Goal weight 167 lb   MS physicians onboard with wegovy  Insurance covers in full  IUD placed 8/2021   Hasn't yet started wegovy  No hx pancreatitis  No personal for family hx MTC     Component      Latest Ref Rng 4/7/2023   WBC      4.4 - 11.3 x10E9/L 4.0 (L)    RBC      4.00 - 5.20 x10E12/L 4.64    HEMOGLOBIN      12.0 - 16.0 g/dL 13.5    HEMATOCRIT      36.0 - 46.0 % 40.8     MCV      80 - 100 fL 88    MCHC      32.0 - 36.0 g/dL 33.1    Platelets      150 - 450 x10E9/L 347    RED CELL DISTRIBUTION WIDTH      11.5 - 14.5 % 15.0 (H)    Neutrophils %      40.0 - 80.0 % 0.9    Immature Granulocytes %, Automated      0.0 - 0.9 % 1.3 (H)    Lymphocytes %      13.0 - 44.0 % 50.4    Monocytes %      2.0 - 10.0 % 41.3    Eosinophils %      0.0 - 6.0 % 3.8    Basophils %      0.0 - 2.0 % 2.3    Neutrophils Absolute      1.20 - 7.70 x10E9/L 0.04 (L)    Lymphocytes Absolute      1.20 - 4.80 x10E9/L 1.99    Monocytes Absolute      0.10 - 1.00 x10E9/L 1.63 (H)    Eosinophils Absolute      0.00 - 0.70 x10E9/L 0.15    Basophils Absolute      0.00 - 0.10 x10E9/L 0.09    GLUCOSE      74 - 99 mg/dL 82    SODIUM      136 - 145 mmol/L 140    POTASSIUM      3.5 - 5.3 mmol/L 4.4    CHLORIDE      98 - 107 mmol/L 104    Bicarbonate      21 - 32 mmol/L 23    Anion Gap      10 - 20 mmol/L 17    Blood Urea Nitrogen      6 - 23 mg/dL 16    Creatinine      0.50 - 1.05 mg/dL 0.80    GFR Female      >90 mL/min/1.73m2 >90    Calcium      8.6 - 10.3 mg/dL 9.2    Albumin      3.4 - 5.0 g/dL 4.4    Alkaline Phosphatase      33 - 110 U/L 88    Total Protein      6.4 - 8.2 g/dL 7.6    AST      9 - 39 U/L 19    Bilirubin Total      0.0 - 1.2 mg/dL 0.3    ALT      7 - 45 U/L 11    CHOLESTEROL      0 - 199 mg/dL 177    HDL CHOLESTEROL      mg/dL 91.9    Cholesterol/HDL Ratio 1.9    LDL      0 - 99 mg/dL 76    VLDL      0 - 40 mg/dL 9    TRIGLYCERIDES      0 - 149 mg/dL 47    IRON      35 - 150 ug/dL 27 (L)    TIBC      240 - 445 ug/dL 393    % Saturation      25 - 45 % 7 (L)    Thyroid Stimulating Hormone      0.44 - 3.98 mIU/L 1.77    Vit D, 1,25-Dihydroxy      19.9 - 79.3 pg/mL 82.3 (H)    MAGNESIUM      1.60 - 2.40 mg/dL 1.84    Vitamin B12      211 - 911 pg/mL 449    FERRITIN      8 - 150 ug/L 26       Review of Systems   All other systems reviewed and are negative.      BP Readings from Last 3 Encounters:   04/07/23  "135/88   06/06/22 (!) 132/92   08/09/21 118/76      Wt Readings from Last 3 Encounters:   09/29/23 89.4 kg (197 lb)   07/28/23 91.6 kg (202 lb)   04/07/23 87.8 kg (193 lb 9.6 oz)      BMI:   Estimated body mass index is 29.09 kg/m² as calculated from the following:    Height as of this encounter: 1.753 m (5' 9\").    Weight as of this encounter: 89.4 kg (197 lb).    Objective   Physical Exam  Gen: Alert, NAD  Respiratory:  resp effort NL  Neuro:  coordination intact   Mood: normal    Sitting upright     "

## 2023-09-29 NOTE — ASSESSMENT & PLAN NOTE
R/B/SE semaglutide discussed with patient. Red flags discussed. She agrees to start this medication

## 2023-10-04 ENCOUNTER — LAB (OUTPATIENT)
Dept: LAB | Facility: LAB | Age: 38
End: 2023-10-04
Payer: COMMERCIAL

## 2023-10-04 DIAGNOSIS — E55.9 VITAMIN D INSUFFICIENCY: ICD-10-CM

## 2023-10-04 DIAGNOSIS — E61.1 IRON DEFICIENCY: ICD-10-CM

## 2023-10-04 DIAGNOSIS — F32.A ANXIETY AND DEPRESSION: ICD-10-CM

## 2023-10-04 DIAGNOSIS — E55.9 VITAMIN D DEFICIENCY: ICD-10-CM

## 2023-10-04 DIAGNOSIS — G35 MULTIPLE SCLEROSIS (MULTI): ICD-10-CM

## 2023-10-04 DIAGNOSIS — Z00.00 ROUTINE GENERAL MEDICAL EXAMINATION AT A HEALTH CARE FACILITY: ICD-10-CM

## 2023-10-04 DIAGNOSIS — R79.89 ABNORMAL CBC: ICD-10-CM

## 2023-10-04 DIAGNOSIS — F41.9 ANXIETY AND DEPRESSION: ICD-10-CM

## 2023-10-04 LAB
25(OH)D3 SERPL-MCNC: 65 NG/ML (ref 30–100)
ANION GAP SERPL CALC-SCNC: 11 MMOL/L (ref 10–20)
BASOPHILS # BLD AUTO: 0.09 X10*3/UL (ref 0–0.1)
BASOPHILS NFR BLD AUTO: 1.2 %
BUN SERPL-MCNC: 17 MG/DL (ref 6–23)
CALCIUM SERPL-MCNC: 9.8 MG/DL (ref 8.6–10.6)
CHLORIDE SERPL-SCNC: 103 MMOL/L (ref 98–107)
CO2 SERPL-SCNC: 26 MMOL/L (ref 21–32)
CREAT SERPL-MCNC: 0.79 MG/DL (ref 0.5–1.05)
EOSINOPHIL # BLD AUTO: 0.07 X10*3/UL (ref 0–0.7)
EOSINOPHIL NFR BLD AUTO: 0.9 %
ERYTHROCYTE [DISTWIDTH] IN BLOOD BY AUTOMATED COUNT: 13.5 % (ref 11.5–14.5)
GFR SERPL CREATININE-BSD FRML MDRD: >90 ML/MIN/1.73M*2
GLUCOSE SERPL-MCNC: 85 MG/DL (ref 74–99)
HCT VFR BLD AUTO: 41.3 % (ref 36–46)
HGB BLD-MCNC: 13.2 G/DL (ref 12–16)
IMM GRANULOCYTES # BLD AUTO: 0.01 X10*3/UL (ref 0–0.7)
IMM GRANULOCYTES NFR BLD AUTO: 0.1 % (ref 0–0.9)
IRON SATN MFR SERPL: 20 % (ref 25–45)
IRON SERPL-MCNC: 88 UG/DL (ref 35–150)
LIPASE SERPL-CCNC: 44 U/L (ref 9–82)
LYMPHOCYTES # BLD AUTO: 2.24 X10*3/UL (ref 1.2–4.8)
LYMPHOCYTES NFR BLD AUTO: 30 %
MAGNESIUM SERPL-MCNC: 1.91 MG/DL (ref 1.6–2.4)
MCH RBC QN AUTO: 30.3 PG (ref 26–34)
MCHC RBC AUTO-ENTMCNC: 32 G/DL (ref 32–36)
MCV RBC AUTO: 95 FL (ref 80–100)
MONOCYTES # BLD AUTO: 0.95 X10*3/UL (ref 0.1–1)
MONOCYTES NFR BLD AUTO: 12.7 %
NEUTROPHILS # BLD AUTO: 4.1 X10*3/UL (ref 1.2–7.7)
NEUTROPHILS NFR BLD AUTO: 55.1 %
NRBC BLD-RTO: 0 /100 WBCS (ref 0–0)
PLATELET # BLD AUTO: 365 X10*3/UL (ref 150–450)
PMV BLD AUTO: 10.5 FL (ref 7.5–11.5)
POTASSIUM SERPL-SCNC: 4.6 MMOL/L (ref 3.5–5.3)
RBC # BLD AUTO: 4.35 X10*6/UL (ref 4–5.2)
SODIUM SERPL-SCNC: 135 MMOL/L (ref 136–145)
T3FREE SERPL-MCNC: 3 PG/ML (ref 2.3–4.2)
T4 FREE SERPL-MCNC: 0.83 NG/DL (ref 0.78–1.48)
TIBC SERPL-MCNC: 437 UG/DL (ref 240–445)
TSH SERPL-ACNC: 1.73 MIU/L (ref 0.44–3.98)
UIBC SERPL-MCNC: 349 UG/DL (ref 110–370)
WBC # BLD AUTO: 7.5 X10*3/UL (ref 4.4–11.3)

## 2023-10-04 PROCEDURE — 83540 ASSAY OF IRON: CPT

## 2023-10-04 PROCEDURE — 80048 BASIC METABOLIC PNL TOTAL CA: CPT

## 2023-10-04 PROCEDURE — 83550 IRON BINDING TEST: CPT

## 2023-10-04 PROCEDURE — 36415 COLL VENOUS BLD VENIPUNCTURE: CPT

## 2023-10-04 PROCEDURE — 83690 ASSAY OF LIPASE: CPT

## 2023-10-04 PROCEDURE — 85025 COMPLETE CBC W/AUTO DIFF WBC: CPT

## 2023-10-04 PROCEDURE — 83735 ASSAY OF MAGNESIUM: CPT

## 2023-10-16 DIAGNOSIS — F41.9 ANXIETY AND DEPRESSION: ICD-10-CM

## 2023-10-16 DIAGNOSIS — F32.A ANXIETY AND DEPRESSION: ICD-10-CM

## 2023-10-16 DIAGNOSIS — F51.04 CHRONIC INSOMNIA: ICD-10-CM

## 2023-10-16 RX ORDER — LORAZEPAM 1 MG/1
1 TABLET ORAL 3 TIMES DAILY PRN
Qty: 90 TABLET | Refills: 1 | Status: SHIPPED | OUTPATIENT
Start: 2023-10-16 | End: 2024-01-02 | Stop reason: SDUPTHER

## 2023-10-27 ENCOUNTER — TELEMEDICINE (OUTPATIENT)
Dept: PRIMARY CARE | Facility: CLINIC | Age: 38
End: 2023-10-27
Payer: COMMERCIAL

## 2023-10-27 DIAGNOSIS — F41.9 ANXIETY AND DEPRESSION: Primary | ICD-10-CM

## 2023-10-27 DIAGNOSIS — Z79.899 MEDICATION MANAGEMENT: ICD-10-CM

## 2023-10-27 DIAGNOSIS — F32.A ANXIETY AND DEPRESSION: Primary | ICD-10-CM

## 2023-10-27 PROCEDURE — 99213 OFFICE O/P EST LOW 20 MIN: CPT | Performed by: NURSE PRACTITIONER

## 2023-10-27 ASSESSMENT — PATIENT HEALTH QUESTIONNAIRE - PHQ9
1. LITTLE INTEREST OR PLEASURE IN DOING THINGS: SEVERAL DAYS
10. IF YOU CHECKED OFF ANY PROBLEMS, HOW DIFFICULT HAVE THESE PROBLEMS MADE IT FOR YOU TO DO YOUR WORK, TAKE CARE OF THINGS AT HOME, OR GET ALONG WITH OTHER PEOPLE: SOMEWHAT DIFFICULT
2. FEELING DOWN, DEPRESSED OR HOPELESS: SEVERAL DAYS
SUM OF ALL RESPONSES TO PHQ9 QUESTIONS 1 AND 2: 2

## 2023-10-27 ASSESSMENT — ANXIETY QUESTIONNAIRES
2. NOT BEING ABLE TO STOP OR CONTROL WORRYING: SEVERAL DAYS
IF YOU CHECKED OFF ANY PROBLEMS ON THIS QUESTIONNAIRE, HOW DIFFICULT HAVE THESE PROBLEMS MADE IT FOR YOU TO DO YOUR WORK, TAKE CARE OF THINGS AT HOME, OR GET ALONG WITH OTHER PEOPLE: SOMEWHAT DIFFICULT
4. TROUBLE RELAXING: SEVERAL DAYS
GAD7 TOTAL SCORE: 6
1. FEELING NERVOUS, ANXIOUS, OR ON EDGE: SEVERAL DAYS
6. BECOMING EASILY ANNOYED OR IRRITABLE: MORE THAN HALF THE DAYS
3. WORRYING TOO MUCH ABOUT DIFFERENT THINGS: SEVERAL DAYS
5. BEING SO RESTLESS THAT IT IS HARD TO SIT STILL: NOT AT ALL
7. FEELING AFRAID AS IF SOMETHING AWFUL MIGHT HAPPEN: NOT AT ALL

## 2023-10-27 NOTE — PROGRESS NOTES
An interactive telecommunication system which permits real time communications between the patient (at the originating site) and provider (at the distant site) was utilized to provide this telehealth service.    Verbal consent was requested and obtained from the patient for this telehealth visit.   YES  We have confirmed the patient wishes to see me, Dr. Pat Cha, a board certified Internal Medicine physician with an active [place] medical license as well as verified the patient's identity and physical location in [place].  Audio and Visual both.

## 2023-10-27 NOTE — PROGRESS NOTES
Problem List Items Addressed This Visit       Anxiety and depression - Primary    Overview     Started 6/2021 after dx with MS  Genesight testing 8/21: Zoloft causes moderate gene-drug interactions, Wellbutrin and Selegiline cause severe gene-drug interactions  Lexapro caused increased appetite  Prozac caused  burning mouth at 20mg  On Cymbalta  Using Ativan 3x/day         Medication management    Overview     UDS, signed controlled substance contract, and OAR[place] check all up to date  Q3m  10/27/23: no longer using MM;  compliance guidelines discussed            An interactive audio/visual telecommunication system which permits real time communications between the patient (at the originating site) and provider (at the distant site) was utilized to provide this telehealth service.    Verbal consent was requested and obtained from the patient for this telehealth visit.  We have confirmed the patient wishes to see me, Christie Prather, a board certified family nurse practitioner with an active [facility] license as well as verified the patient's identity and physical location in [place].    Controlled Substance Visit:  I have personally reviewed the OARRS report and have considered the risks of abuse, dependence, addiction and diversion and I believe that it is clinically appropriate for the patient to be prescribed this medication.    Is the patient prescribed a combination of a benzodiazepine and opioid?  No    Last Urine Drug Screen / ordered today: No  Recent Results (from the past 8760 hour(s))   OPIATE/OPIOID/BENZO PRESCRIPTION COMPLIANCE    Collection Time: 04/07/23 11:56 AM   Result Value Ref Range    DRUG SCREEN COMMENT URINE SEE BELOW     Creatine, Urine 405.3 mg/dL    Amphetamine Screen, Urine PRESUMPTIVE NEGATIVE NEGATIVE    Barbiturate Screen, Urine PRESUMPTIVE NEGATIVE NEGATIVE    Cannabinoid Screen, Urine PRESUMPTIVE NEGATIVE NEGATIVE    Cocaine Screen, Urine PRESUMPTIVE NEGATIVE NEGATIVE    PCP Screen, Urine  PRESUMPTIVE NEGATIVE NEGATIVE    7-Aminoclonazepam <25 Cutoff <25 ng/mL    Alpha-Hydroxyalprazolam >1000 (A) Cutoff <25 ng/mL    Alpha-Hydroxymidazolam <25 Cutoff <25 ng/mL    Alprazolam 681 (A) Cutoff <25 ng/mL    Chlordiazepoxide <25 Cutoff <25 ng/mL    Clonazepam <25 Cutoff <25 ng/mL    Diazepam <25 Cutoff <25 ng/mL    Lorazepam >1000 (A) Cutoff <25 ng/mL    Midazolam <25 Cutoff <25 ng/mL    Nordiazepam <25 Cutoff <25 ng/mL    Oxazepam <25 Cutoff <25 ng/mL    Temazepam <25 Cutoff <25 ng/mL    Zolpidem <25 Cutoff <25 ng/mL    Zolpidem Metabolite (ZCA) <25 Cutoff <25 ng/mL    6-Acetylmorphine <25 Cutoff <25 ng/mL    Codeine <50 Cutoff <50 ng/mL    Hydrocodone <25 Cutoff <25 ng/mL    Hydromorphone <25 Cutoff <25 ng/mL    Morphine Urine <50 Cutoff <50 ng/mL    Norhydrocodone <25 Cutoff <25 ng/mL    Noroxycodone <25 Cutoff <25 ng/mL    Oxycodone <25 Cutoff <25 ng/mL    Oxymorphone <25 Cutoff <25 ng/mL    Tramadol <50 Cutoff <50 ng/mL    O-Desmethyltramadol <50 Cutoff <50 ng/mL    Fentanyl <2.5 Cutoff<2.5 ng/mL    Norfentanyl <2.5 Cutoff<2.5 ng/mL    METHADONE CONFIRMATION,URINE <25 Cutoff <25 ng/mL    EDDP <25 Cutoff <25 ng/mL     Results are as expected.     Controlled Substance Agreement:  Date of the Last Agreement: 2023  I have reviewed each line item on the Controlled Substance Agreement including, but not limited to, the benefits, risks, and alternatives to treatment with a Controlled Substance medication(s). The patient has verbalized understanding and signed the agreement.    Benzodiazepines:  What is the patient's goal of therapy? Treat anxiety  Is this being achieved with current treatment? yes    BRIANNE-7:  Over the last 2 weeks, how often have you been bothered by any of the following problems?  Feeling nervous, anxious, or on edge: 1  Not being able to stop or control worryin  Worrying too much about different things: 1  Trouble relaxin  Being so restless that it is hard to sit still:  0  Becoming easily annoyed or irritable: 2  Feeling afraid as if something awful might happen: 0  BRIANNE-7 Total Score: 6      Activities of Daily Living:   Is your overall impression that this patient is benefiting (symptom reduction outweighs side effects) from benzodiazepine therapy? Yes     1. Physical Functioning: Same  2. Family Relationship: Better  3. Social Relationship: Better  4. Mood: Better  5. Sleep Patterns: Better  6. Overall Function: Better    Gen: Alert, NAD  Respiratory:  resp effort NL  Neuro:  coordination intact   Mood: normal

## 2023-11-09 ENCOUNTER — TELEMEDICINE (OUTPATIENT)
Dept: PRIMARY CARE | Facility: CLINIC | Age: 38
End: 2023-11-09
Payer: COMMERCIAL

## 2023-11-09 DIAGNOSIS — G35 MULTIPLE SCLEROSIS (MULTI): Primary | ICD-10-CM

## 2023-11-09 PROCEDURE — 99214 OFFICE O/P EST MOD 30 MIN: CPT | Performed by: INTERNAL MEDICINE

## 2023-11-09 ASSESSMENT — PATIENT HEALTH QUESTIONNAIRE - PHQ9
SUM OF ALL RESPONSES TO PHQ9 QUESTIONS 1 AND 2: 1
1. LITTLE INTEREST OR PLEASURE IN DOING THINGS: NOT AT ALL
2. FEELING DOWN, DEPRESSED OR HOPELESS: SEVERAL DAYS
10. IF YOU CHECKED OFF ANY PROBLEMS, HOW DIFFICULT HAVE THESE PROBLEMS MADE IT FOR YOU TO DO YOUR WORK, TAKE CARE OF THINGS AT HOME, OR GET ALONG WITH OTHER PEOPLE: SOMEWHAT DIFFICULT

## 2023-11-09 NOTE — PROGRESS NOTES
An interactive telecommunication system which permits real time communications between the patient (at the originating site) and provider (at the distant site) was utilized to provide this telehealth service.    Verbal consent was requested and obtained from the patient for this telehealth visit.  yes  We have confirmed the patient wishes to see me, Dr. Pat Cha, a board certified Internal Medicine physician with an active [place] medical license as well as verified the patient's identity and physical location in [place].  Audio and Visual both.    CC/HPI:   Med Refill (Samples of Ozempic).  Was started on GLP1 med off label for MS  The GLP-1 receptor agonist may also be effective in reducing leukocyte invasion into tissues of the CNS and reducing the destruction of the underlying neuronal tissues of the CNS, and thus ameliorating the clinical manifestations of multiple sclerosis  Has been nauseated past 2 days but has been on wegovy for 5 weeks and no issues  Did last injection 5 days ago    Last Neuro appt reviewed 5/23 (copied):  ASSESSMENT/PLAN:  Andreina Gil is a 37 year old female with Multiple Sclerosis. She continues to tolerate Ocrevus infusions well. Reports bothersome twitching at night, so will trial low dose tizanidine--reviewed common side effects and to be cautious taking with lorazepam. No other new/concerning symptoms. She and her spouse would like to start family planning. Discussed waiting 8 wks after Ocrevus infusion to try for pregnancy and not to receive infusions or MRIs if pregnant or if pregnancy status unknown. Likely will resume Ocrevus shortly after delivery. Will plan for routine MRIs in the fall if she is not pregnant.    Recent labs:  Component      Latest Ref Rng 10/4/2023   WBC      4.4 - 11.3 x10*3/uL 7.5    nRBC      0.0 - 0.0 /100 WBCs 0.0    RBC      4.00 - 5.20 x10*6/uL 4.35    HEMOGLOBIN      12.0 - 16.0 g/dL 13.2    HEMATOCRIT      36.0 - 46.0 % 41.3    MCV      80 - 100 fL 95     MCH      26.0 - 34.0 pg 30.3    MCHC      32.0 - 36.0 g/dL 32.0    RED CELL DISTRIBUTION WIDTH      11.5 - 14.5 % 13.5    Platelets      150 - 450 x10*3/uL 365    MEAN PLATELET VOLUME      7.5 - 11.5 fL 10.5    Neutrophils %      40.0 - 80.0 % 55.1    Immature Granulocytes %, Automated      0.0 - 0.9 % 0.1    Lymphocytes %      13.0 - 44.0 % 30.0    Monocytes %      2.0 - 10.0 % 12.7    Eosinophils %      0.0 - 6.0 % 0.9    Basophils %      0.0 - 2.0 % 1.2    Neutrophils Absolute      1.20 - 7.70 x10*3/uL 4.10    Immature Granulocytes Absolute, Automated      0.00 - 0.70 x10*3/uL 0.01    Lymphocytes Absolute      1.20 - 4.80 x10*3/uL 2.24    Monocytes Absolute      0.10 - 1.00 x10*3/uL 0.95    Eosinophils Absolute      0.00 - 0.70 x10*3/uL 0.07    Basophils Absolute      0.00 - 0.10 x10*3/uL 0.09    GLUCOSE      74 - 99 mg/dL 85    SODIUM      136 - 145 mmol/L 135 (L)    POTASSIUM      3.5 - 5.3 mmol/L 4.6    CHLORIDE      98 - 107 mmol/L 103    Bicarbonate      21 - 32 mmol/L 26    Anion Gap      10 - 20 mmol/L 11    Blood Urea Nitrogen      6 - 23 mg/dL 17    Creatinine      0.50 - 1.05 mg/dL 0.79    EGFR      >60 mL/min/1.73m*2 >90    Calcium      8.6 - 10.6 mg/dL 9.8    IRON      35 - 150 ug/dL 88    UIBC      110 - 370 ug/dL 349    TIBC      240 - 445 ug/dL 437    % Saturation      25 - 45 % 20 (L)    Vitamin D, 25-Hydroxy, Total      30 - 100 ng/mL 65    MAGNESIUM      1.60 - 2.40 mg/dL 1.91    Thyroxine, Free      0.78 - 1.48 ng/dL 0.83    Triiodothyronine, Free      2.3 - 4.2 pg/mL 3.0    Thyroid Stimulating Hormone      0.44 - 3.98 mIU/L 1.73    LIPASE      9 - 82 U/L 44         Active Problem List  Patient Active Problem List   Diagnosis    Anxiety and depression    Asymptomatic microscopic hematuria    Blood pressure elevated without history of HTN    Cervical spine pain    Chronic insomnia    Contact dermatitis    History of shingles    Lumbar spondylosis    Mild intermittent asthma    Multiple  sclerosis (CMS/HCC)    PVC (premature ventricular contraction)    Seasonal allergies    Vitamin D deficiency    BMI 29.0-29.9,adult    H/O abnormal cervical Papanicolaou smear    Medication management    Routine adult health maintenance       Allergies and Medications  Cat dander, Escitalopram oxalate, House dust, and Sertraline  Current Outpatient Medications   Medication Instructions    albuterol 90 mcg/actuation inhaler inhalation    cholecalciferol (VITAMIN D-3) 50,000 Units, oral, Weekly    DULoxetine 40 mg, oral, Daily    LORazepam (ATIVAN) 1 mg, oral, 3 times daily PRN    Ocrevus 300 mg, intravenous, Infuse every 6 months    tiZANidine (ZANAFLEX) 2 mg, oral, 2 times daily PRN       ROS otherwise negative aside from what was mentioned above in HPI.    Vitals  Physical Exam  Gen: Alert, NAD  HEENT: Unremarkable  Neuro:  Gross motor and sensory intact      Assessment and Plan:  Problem List Items Addressed This Visit          Medium    Multiple sclerosis (CMS/HCC) - Primary    Overview     dx 6/2021   Baclofen prn and Ativan for muscle spasms and dizziness associated with her disease  On Ocrevus, started in August 2021.  [facility] manages  Started GLP1 med 'off label' in 9/23 (thought to be beneficial for MS): The GLP-1 receptor agonist may also be effective in reducing leukocyte invasion into tissues of the CNS and reducing the destruction of the underlying neuronal tissues of the CNS, and thus ameliorating the clinical manifestations of multiple sclerosis. Aware of R&B of this.

## 2023-11-10 DIAGNOSIS — G35 MULTIPLE SCLEROSIS (MULTI): ICD-10-CM

## 2023-11-10 RX ORDER — SEMAGLUTIDE 1 MG/.5ML
1 INJECTION, SOLUTION SUBCUTANEOUS
Qty: 2 ML | Refills: 0 | Status: SHIPPED | OUTPATIENT
Start: 2023-11-10 | End: 2024-01-16 | Stop reason: ALTCHOICE

## 2023-12-07 ENCOUNTER — SPECIALTY PHARMACY (OUTPATIENT)
Dept: PHARMACY | Facility: CLINIC | Age: 38
End: 2023-12-07

## 2024-01-02 ENCOUNTER — TELEPHONE (OUTPATIENT)
Dept: PRIMARY CARE | Facility: CLINIC | Age: 39
End: 2024-01-02
Payer: COMMERCIAL

## 2024-01-02 DIAGNOSIS — F32.A ANXIETY AND DEPRESSION: ICD-10-CM

## 2024-01-02 DIAGNOSIS — F41.9 ANXIETY AND DEPRESSION: ICD-10-CM

## 2024-01-02 DIAGNOSIS — F51.04 CHRONIC INSOMNIA: ICD-10-CM

## 2024-01-02 RX ORDER — LORAZEPAM 1 MG/1
1 TABLET ORAL 3 TIMES DAILY PRN
Qty: 90 TABLET | Refills: 0 | Status: SHIPPED | OUTPATIENT
Start: 2024-01-02 | End: 2024-01-16 | Stop reason: SDUPTHER

## 2024-01-05 ENCOUNTER — TELEPHONE (OUTPATIENT)
Dept: PRIMARY CARE | Facility: CLINIC | Age: 39
End: 2024-01-05
Payer: COMMERCIAL

## 2024-01-05 NOTE — TELEPHONE ENCOUNTER
Patient called to schedule an appointment specifically with  for a [facility] and requested for it to be VV. I scheduled patient for a VV with  Tuesday 1/16/2024 at 10:30am

## 2024-01-16 ENCOUNTER — TELEPHONE (OUTPATIENT)
Dept: PRIMARY CARE | Facility: CLINIC | Age: 39
End: 2024-01-16

## 2024-01-16 ENCOUNTER — TELEMEDICINE (OUTPATIENT)
Dept: PRIMARY CARE | Facility: CLINIC | Age: 39
End: 2024-01-16
Payer: COMMERCIAL

## 2024-01-16 DIAGNOSIS — F51.04 CHRONIC INSOMNIA: ICD-10-CM

## 2024-01-16 DIAGNOSIS — F41.9 ANXIETY AND DEPRESSION: Primary | ICD-10-CM

## 2024-01-16 DIAGNOSIS — G35 MULTIPLE SCLEROSIS (MULTI): ICD-10-CM

## 2024-01-16 DIAGNOSIS — F32.A ANXIETY AND DEPRESSION: Primary | ICD-10-CM

## 2024-01-16 DIAGNOSIS — J45.20 MILD INTERMITTENT ASTHMA WITHOUT COMPLICATION (HHS-HCC): ICD-10-CM

## 2024-01-16 DIAGNOSIS — Z79.899 MEDICATION MANAGEMENT: ICD-10-CM

## 2024-01-16 PROCEDURE — 99213 OFFICE O/P EST LOW 20 MIN: CPT | Performed by: INTERNAL MEDICINE

## 2024-01-16 RX ORDER — LORAZEPAM 1 MG/1
1 TABLET ORAL 3 TIMES DAILY PRN
Qty: 90 TABLET | Refills: 0 | Status: SHIPPED | OUTPATIENT
Start: 2024-01-16 | End: 2024-04-14 | Stop reason: SDUPTHER

## 2024-01-16 RX ORDER — SEMAGLUTIDE 1.7 MG/.75ML
1.7 INJECTION, SOLUTION SUBCUTANEOUS
Qty: 3 ML | Refills: 0 | Status: SHIPPED | OUTPATIENT
Start: 2024-01-16 | End: 2024-04-14 | Stop reason: DRUGHIGH

## 2024-01-16 RX ORDER — NYSTATIN 100000 U/G
1 CREAM TOPICAL 3 TIMES DAILY PRN
COMMUNITY
Start: 2022-08-16

## 2024-01-16 RX ORDER — IBUPROFEN 600 MG/1
600 TABLET ORAL EVERY 6 HOURS PRN
COMMUNITY
Start: 2021-07-02

## 2024-01-16 ASSESSMENT — PATIENT HEALTH QUESTIONNAIRE - PHQ9
1. LITTLE INTEREST OR PLEASURE IN DOING THINGS: SEVERAL DAYS
2. FEELING DOWN, DEPRESSED OR HOPELESS: SEVERAL DAYS
10. IF YOU CHECKED OFF ANY PROBLEMS, HOW DIFFICULT HAVE THESE PROBLEMS MADE IT FOR YOU TO DO YOUR WORK, TAKE CARE OF THINGS AT HOME, OR GET ALONG WITH OTHER PEOPLE: NOT DIFFICULT AT ALL
SUM OF ALL RESPONSES TO PHQ9 QUESTIONS 1 AND 2: 2

## 2024-01-16 ASSESSMENT — ANXIETY QUESTIONNAIRES
3. WORRYING TOO MUCH ABOUT DIFFERENT THINGS: SEVERAL DAYS
5. BEING SO RESTLESS THAT IT IS HARD TO SIT STILL: SEVERAL DAYS
IF YOU CHECKED OFF ANY PROBLEMS ON THIS QUESTIONNAIRE, HOW DIFFICULT HAVE THESE PROBLEMS MADE IT FOR YOU TO DO YOUR WORK, TAKE CARE OF THINGS AT HOME, OR GET ALONG WITH OTHER PEOPLE: NOT DIFFICULT AT ALL
7. FEELING AFRAID AS IF SOMETHING AWFUL MIGHT HAPPEN: NOT AT ALL
1. FEELING NERVOUS, ANXIOUS, OR ON EDGE: SEVERAL DAYS
4. TROUBLE RELAXING: NOT AT ALL
6. BECOMING EASILY ANNOYED OR IRRITABLE: SEVERAL DAYS
GAD7 TOTAL SCORE: 5
2. NOT BEING ABLE TO STOP OR CONTROL WORRYING: SEVERAL DAYS

## 2024-01-16 NOTE — TELEPHONE ENCOUNTER
Called patient and left a voicemail with our office number to help schedule [facility] in 3 months with either PCP or NP, and for a CPE/CSV in 6 months with PCP

## 2024-01-16 NOTE — PROGRESS NOTES
An interactive telecommunication system which permits real time communications between the patient (at the originating site) and provider (at the distant site) was utilized to provide this telehealth service.    Verbal consent was requested and obtained from the patient for this telehealth visit.  yes  Problem List Items Addressed This Visit          Medium    Anxiety and depression - Primary    Overview     Started 6/2021 after dx with MS  Genesight testing 8/21: Zoloft causes moderate gene-drug interactions, Wellbutrin and Selegiline cause severe gene-drug interactions  Lexapro caused increased appetite  Prozac caused  burning mouth at 20mg  On Cymbalta  Using Ativan 3x/day         Relevant Medications    LORazepam (Ativan) 1 mg tablet    BMI 29.0-29.9,adult    Overview     Discussed exercise/PT and diet  MS provider cleared her for semaglutide  No hx pancreatitis, personal/family hx MTC  9/29/23: started wegovy  (supply issues caused us to use ozempic samples to bridge therapy)           Relevant Medications    semaglutide, weight loss, (Wegovy) 1.7 mg/0.75 mL pen injector    Chronic insomnia    Overview     Ambien didnt work  UniLiveSafe helps for few hours  IV Benadryl worked  Trazodone may be interacting with her SSRI, stopped 9/21  Using Ativan at HS         Relevant Medications    LORazepam (Ativan) 1 mg tablet    Medication management    Overview     UDS, signed controlled substance contract, and OARRS check all up to date  Q3m  10/27/23: no longer using MM;  compliance guidelines discussed          Relevant Orders    Opiate/Opioid/Benzo Prescription Compliance    Mild intermittent asthma    Overview     Weather induced         Multiple sclerosis (CMS/HCC)    Overview     dx 6/2021   Baclofen prn and Ativan for muscle spasms and dizziness associated with her disease  On Ocrevus, started in August 2021.  [facility] manages  Started GLP1 med 'off label' in 9/23 (thought to be beneficial for MS): The GLP-1  receptor agonist may also be effective in reducing leukocyte invasion into tissues of the CNS and reducing the destruction of the underlying neuronal tissues of the CNS, and thus ameliorating the clinical manifestations of multiple sclerosis. Aware of R&B of this.         Relevant Medications    semaglutide, weight loss, (Wegovy) 1.7 mg/0.75 mL pen injector      Started on Wegovy last year  Gave her samples Ozempic bc couldn't get Wegovy  Lost weight  In July was 202lbs  Doing well    Controlled Substance Visit:  I have personally reviewed the OARRS report and have considered the risks of abuse, dependence, addiction and diversion and I believe that it is clinically appropriate for the patient to be prescribed this medication.    Is the patient prescribed a combination of a benzodiazepine and opioid?  No    Last Urine Drug Screen / ordered today: Yes  Recent Results (from the past 8760 hour(s))   OPIATE/OPIOID/BENZO PRESCRIPTION COMPLIANCE    Collection Time: 04/07/23 11:56 AM   Result Value Ref Range    DRUG SCREEN COMMENT URINE SEE BELOW     Creatine, Urine 405.3 mg/dL    Amphetamine Screen, Urine PRESUMPTIVE NEGATIVE NEGATIVE    Barbiturate Screen, Urine PRESUMPTIVE NEGATIVE NEGATIVE    Cannabinoid Screen, Urine PRESUMPTIVE NEGATIVE NEGATIVE    Cocaine Screen, Urine PRESUMPTIVE NEGATIVE NEGATIVE    PCP Screen, Urine PRESUMPTIVE NEGATIVE NEGATIVE    7-Aminoclonazepam <25 Cutoff <25 ng/mL    Alpha-Hydroxyalprazolam >1000 (A) Cutoff <25 ng/mL    Alpha-Hydroxymidazolam <25 Cutoff <25 ng/mL    Alprazolam 681 (A) Cutoff <25 ng/mL    Chlordiazepoxide <25 Cutoff <25 ng/mL    Clonazepam <25 Cutoff <25 ng/mL    Diazepam <25 Cutoff <25 ng/mL    Lorazepam >1000 (A) Cutoff <25 ng/mL    Midazolam <25 Cutoff <25 ng/mL    Nordiazepam <25 Cutoff <25 ng/mL    Oxazepam <25 Cutoff <25 ng/mL    Temazepam <25 Cutoff <25 ng/mL    Zolpidem <25 Cutoff <25 ng/mL    Zolpidem Metabolite (ZCA) <25 Cutoff <25 ng/mL    6-Acetylmorphine <25  Cutoff <25 ng/mL    Codeine <50 Cutoff <50 ng/mL    Hydrocodone <25 Cutoff <25 ng/mL    Hydromorphone <25 Cutoff <25 ng/mL    Morphine Urine <50 Cutoff <50 ng/mL    Norhydrocodone <25 Cutoff <25 ng/mL    Noroxycodone <25 Cutoff <25 ng/mL    Oxycodone <25 Cutoff <25 ng/mL    Oxymorphone <25 Cutoff <25 ng/mL    Tramadol <50 Cutoff <50 ng/mL    O-Desmethyltramadol <50 Cutoff <50 ng/mL    Fentanyl <2.5 Cutoff<2.5 ng/mL    Norfentanyl <2.5 Cutoff<2.5 ng/mL    METHADONE CONFIRMATION,URINE <25 Cutoff <25 ng/mL    EDDP <25 Cutoff <25 ng/mL     Results are as expected.     Controlled Substance Agreement:  Date of the Last Agreement: 2023  I have reviewed each line item on the Controlled Substance Agreement including, but not limited to, the benefits, risks, and alternatives to treatment with a Controlled Substance medication(s). The patient has verbalized understanding and signed the agreement.    Benzodiazepines:  What is the patient's goal of therapy? To reduce anxiety as well as to help fall asleep  Is this being achieved with current treatment? yes    BRIANNE-7:  Over the last 2 weeks, how often have you been bothered by any of the following problems?  Feeling nervous, anxious, or on edge: 1  Not being able to stop or control worryin  Worrying too much about different things: 1  Trouble relaxin  Being so restless that it is hard to sit still: 1  Becoming easily annoyed or irritable: 1  Feeling afraid as if something awful might happen: 0  BRIANNE-7 Total Score: 5        Activities of Daily Living:   Is your overall impression that this patient is benefiting (symptom reduction outweighs side effects) from benzodiazepine therapy? Yes     1. Physical Functioning: Same  2. Family Relationship: Same  3. Social Relationship: Same  4. Mood: Same  5. Sleep Patterns: Same  6. Overall Function: Same            We have confirmed the patient wishes to see me, Dr. Pat Cha, a board certified Internal Medicine physician with  an active [place] medical license as well as verified the patient's identity and physical location in [place].  Audio and Visual both.

## 2024-02-09 ENCOUNTER — TELEMEDICINE (OUTPATIENT)
Dept: PRIMARY CARE | Facility: CLINIC | Age: 39
End: 2024-02-09
Payer: COMMERCIAL

## 2024-02-09 VITALS — BODY MASS INDEX: 27.25 KG/M2 | WEIGHT: 184 LBS | HEIGHT: 69 IN

## 2024-02-09 DIAGNOSIS — E66.3 OVERWEIGHT WITH BODY MASS INDEX (BMI) OF 27 TO 27.9 IN ADULT: ICD-10-CM

## 2024-02-09 DIAGNOSIS — R03.0 BLOOD PRESSURE ELEVATED WITHOUT HISTORY OF HTN: ICD-10-CM

## 2024-02-09 DIAGNOSIS — G35 MULTIPLE SCLEROSIS (MULTI): Primary | ICD-10-CM

## 2024-02-09 PROCEDURE — 99213 OFFICE O/P EST LOW 20 MIN: CPT | Performed by: NURSE PRACTITIONER

## 2024-02-09 PROCEDURE — 1036F TOBACCO NON-USER: CPT | Performed by: NURSE PRACTITIONER

## 2024-02-09 PROCEDURE — 3008F BODY MASS INDEX DOCD: CPT | Performed by: NURSE PRACTITIONER

## 2024-02-09 ASSESSMENT — PATIENT HEALTH QUESTIONNAIRE - PHQ9
2. FEELING DOWN, DEPRESSED OR HOPELESS: SEVERAL DAYS
10. IF YOU CHECKED OFF ANY PROBLEMS, HOW DIFFICULT HAVE THESE PROBLEMS MADE IT FOR YOU TO DO YOUR WORK, TAKE CARE OF THINGS AT HOME, OR GET ALONG WITH OTHER PEOPLE: SOMEWHAT DIFFICULT
1. LITTLE INTEREST OR PLEASURE IN DOING THINGS: SEVERAL DAYS
SUM OF ALL RESPONSES TO PHQ9 QUESTIONS 1 AND 2: 2

## 2024-02-09 NOTE — PROGRESS NOTES
An interactive audio/visual  telecommunication system which permits real time communications between the patient (at the originating site) and provider (at the distant site) was utilized to provide this telehealth service.    Verbal consent was requested and obtained from the patient for this telehealth visit.  We have confirmed the patient wishes to see me, Christie Prather, a board certified nurse practitioner with an active [place] advanced practice license as well as verified the patient's identity and physical location in [place].    Problem List Items Addressed This Visit       Blood pressure elevated without history of HTN    Overview     9/6/22 Cuff Comparison  Office cuff -  124/87   hr 84  Home cuff -   129/85   hr 85         Relevant Orders    Basic metabolic panel    Lipase    Multiple sclerosis (CMS/HCC) - Primary    Overview     dx 6/2021   Baclofen prn and Ativan for muscle spasms and dizziness associated with her disease  On Ocrevus, started in August 2021.  [facility] manages  Started GLP1 med 'off label' in 9/23 (thought to be beneficial for MS): The GLP-1 receptor agonist may also be effective in reducing leukocyte invasion into tissues of the CNS and reducing the destruction of the underlying neuronal tissues of the CNS, and thus ameliorating the clinical manifestations of multiple sclerosis. Aware of R&B of this.         Relevant Orders    Basic metabolic panel    Lipase    Overweight with body mass index (BMI) of 27 to 27.9 in adult    Overview     Discussed exercise/PT and diet  MS provider cleared her for semaglutide  No hx pancreatitis, personal/family hx MTC  9/29/23: started wegovy  (supply issues caused us to use ozempic samples to bridge therapy)           Relevant Orders    Basic metabolic panel    Lipase        Subjective   Patient ID: Andreina Gil is a 38 y.o. female who presents for Medication Visit ( sample of ozempic).  HPI  Body mass index is 27.17 kg/m².    Started wegovy with  "samples  Insurance covers once she meets deductible  - Waiting for infusion to be applied to deductible  Nausea second day then tapers off    Component      Latest Ref Rng 10/4/2023   GLUCOSE      74 - 99 mg/dL 85    SODIUM      136 - 145 mmol/L 135 (L)    POTASSIUM      3.5 - 5.3 mmol/L 4.6    CHLORIDE      98 - 107 mmol/L 103    Bicarbonate      21 - 32 mmol/L 26    Anion Gap      10 - 20 mmol/L 11    Blood Urea Nitrogen      6 - 23 mg/dL 17    Creatinine      0.50 - 1.05 mg/dL 0.79    EGFR      >60 mL/min/1.73m*2 >90    Calcium      8.6 - 10.6 mg/dL 9.8    LIPASE      9 - 82 U/L 44          Review of Systems   All other systems reviewed and are negative.      BP Readings from Last 3 Encounters:   04/07/23 135/88   06/06/22 (!) 132/92   08/09/21 118/76      Wt Readings from Last 3 Encounters:   02/09/24 83.5 kg (184 lb)   09/29/23 89.4 kg (197 lb)   07/28/23 91.6 kg (202 lb)      BMI:   Estimated body mass index is 27.17 kg/m² as calculated from the following:    Height as of this encounter: 1.753 m (5' 9\").    Weight as of this encounter: 83.5 kg (184 lb).    Objective   Physical Exam  Gen: Alert, NAD  Respiratory:  resp effort NL  Neuro:  coordination intact   Mood: normal          "

## 2024-02-26 ENCOUNTER — LAB (OUTPATIENT)
Dept: LAB | Facility: LAB | Age: 39
End: 2024-02-26
Payer: COMMERCIAL

## 2024-02-26 LAB
AMYLASE SERPL-CCNC: 39 U/L (ref 29–103)
ANION GAP SERPL CALC-SCNC: 12 MMOL/L (ref 10–20)
BUN SERPL-MCNC: 15 MG/DL (ref 6–23)
CALCIUM SERPL-MCNC: 9.5 MG/DL (ref 8.6–10.3)
CHLORIDE SERPL-SCNC: 105 MMOL/L (ref 98–107)
CO2 SERPL-SCNC: 26 MMOL/L (ref 21–32)
CREAT SERPL-MCNC: 0.77 MG/DL (ref 0.5–1.05)
EGFRCR SERPLBLD CKD-EPI 2021: >90 ML/MIN/1.73M*2
GLUCOSE SERPL-MCNC: 73 MG/DL (ref 74–99)
LIPASE SERPL-CCNC: 37 U/L (ref 9–82)
POTASSIUM SERPL-SCNC: 4.7 MMOL/L (ref 3.5–5.3)
SODIUM SERPL-SCNC: 138 MMOL/L (ref 136–145)

## 2024-02-26 PROCEDURE — 80048 BASIC METABOLIC PNL TOTAL CA: CPT

## 2024-02-26 PROCEDURE — 83690 ASSAY OF LIPASE: CPT

## 2024-02-26 PROCEDURE — 82150 ASSAY OF AMYLASE: CPT

## 2024-02-26 PROCEDURE — 36415 COLL VENOUS BLD VENIPUNCTURE: CPT

## 2024-04-14 ENCOUNTER — PATIENT MESSAGE (OUTPATIENT)
Dept: PRIMARY CARE | Facility: CLINIC | Age: 39
End: 2024-04-14
Payer: COMMERCIAL

## 2024-04-14 ENCOUNTER — TELEPHONE (OUTPATIENT)
Dept: PRIMARY CARE | Facility: CLINIC | Age: 39
End: 2024-04-14
Payer: COMMERCIAL

## 2024-04-14 DIAGNOSIS — F32.A ANXIETY AND DEPRESSION: ICD-10-CM

## 2024-04-14 DIAGNOSIS — E66.3 OVERWEIGHT WITH BODY MASS INDEX (BMI) OF 27 TO 27.9 IN ADULT: Primary | ICD-10-CM

## 2024-04-14 DIAGNOSIS — F51.04 CHRONIC INSOMNIA: ICD-10-CM

## 2024-04-14 DIAGNOSIS — F41.9 ANXIETY AND DEPRESSION: ICD-10-CM

## 2024-04-14 RX ORDER — LORAZEPAM 1 MG/1
1 TABLET ORAL 3 TIMES DAILY PRN
Qty: 90 TABLET | Refills: 0 | Status: SHIPPED | OUTPATIENT
Start: 2024-04-14 | End: 2024-04-15 | Stop reason: SDUPTHER

## 2024-04-14 RX ORDER — SEMAGLUTIDE 2.4 MG/.75ML
2.4 INJECTION, SOLUTION SUBCUTANEOUS
Qty: 3 ML | Refills: 2 | Status: SHIPPED | OUTPATIENT
Start: 2024-04-14 | End: 2024-07-07

## 2024-04-14 RX ORDER — LORAZEPAM 1 MG/1
1 TABLET ORAL 3 TIMES DAILY PRN
Qty: 90 TABLET | Refills: 0 | Status: SHIPPED | OUTPATIENT
Start: 2024-04-14 | End: 2024-04-14 | Stop reason: SDUPTHER

## 2024-04-14 RX ORDER — LORAZEPAM 1 MG/1
1 TABLET ORAL 3 TIMES DAILY PRN
Qty: 90 TABLET | Refills: 0 | Status: SHIPPED | OUTPATIENT
Start: 2024-04-14 | End: 2024-04-17 | Stop reason: SDUPTHER

## 2024-04-14 NOTE — TELEPHONE ENCOUNTER
Pt requesting refill of ativan  Has appt this week with Christie  Will refill, she will complete UDS on 4/17/24

## 2024-04-15 DIAGNOSIS — F32.A ANXIETY AND DEPRESSION: ICD-10-CM

## 2024-04-15 DIAGNOSIS — F41.9 ANXIETY AND DEPRESSION: ICD-10-CM

## 2024-04-15 DIAGNOSIS — F51.04 CHRONIC INSOMNIA: ICD-10-CM

## 2024-04-15 RX ORDER — LORAZEPAM 1 MG/1
1 TABLET ORAL 3 TIMES DAILY PRN
Qty: 90 TABLET | Refills: 0 | Status: SHIPPED | OUTPATIENT
Start: 2024-04-15

## 2024-04-17 ENCOUNTER — OFFICE VISIT (OUTPATIENT)
Dept: PRIMARY CARE | Facility: CLINIC | Age: 39
End: 2024-04-17
Payer: COMMERCIAL

## 2024-04-17 VITALS
SYSTOLIC BLOOD PRESSURE: 128 MMHG | DIASTOLIC BLOOD PRESSURE: 84 MMHG | BODY MASS INDEX: 26.67 KG/M2 | OXYGEN SATURATION: 98 % | TEMPERATURE: 97.3 F | HEART RATE: 93 BPM | WEIGHT: 180.6 LBS

## 2024-04-17 DIAGNOSIS — N89.8 VAGINAL DISCHARGE: ICD-10-CM

## 2024-04-17 DIAGNOSIS — R10.2 PELVIC PAIN: Primary | ICD-10-CM

## 2024-04-17 DIAGNOSIS — Z79.899 MEDICATION MANAGEMENT: ICD-10-CM

## 2024-04-17 DIAGNOSIS — F41.9 ANXIETY AND DEPRESSION: ICD-10-CM

## 2024-04-17 DIAGNOSIS — F32.A ANXIETY AND DEPRESSION: ICD-10-CM

## 2024-04-17 DIAGNOSIS — Z87.42 H/O ABNORMAL CERVICAL PAPANICOLAOU SMEAR: ICD-10-CM

## 2024-04-17 DIAGNOSIS — Z12.4 SCREENING FOR CERVICAL CANCER: ICD-10-CM

## 2024-04-17 LAB
AMPHETAMINES UR QL SCN: ABNORMAL
BARBITURATES UR QL SCN: ABNORMAL
BZE UR QL SCN: ABNORMAL
CANNABINOIDS UR QL SCN: ABNORMAL
CREAT UR-MCNC: 352.7 MG/DL (ref 20–320)
PCP UR QL SCN: ABNORMAL
POC FECAL OCCULT BLOOD: NEGATIVE

## 2024-04-17 PROCEDURE — 87624 HPV HI-RISK TYP POOLED RSLT: CPT

## 2024-04-17 PROCEDURE — 87800 DETECT AGNT MULT DNA DIREC: CPT

## 2024-04-17 PROCEDURE — 87661 TRICHOMONAS VAGINALIS AMPLIF: CPT

## 2024-04-17 PROCEDURE — 80361 OPIATES 1 OR MORE: CPT

## 2024-04-17 PROCEDURE — 88175 CYTOPATH C/V AUTO FLUID REDO: CPT

## 2024-04-17 PROCEDURE — 80358 DRUG SCREENING METHADONE: CPT

## 2024-04-17 PROCEDURE — 1036F TOBACCO NON-USER: CPT | Performed by: NURSE PRACTITIONER

## 2024-04-17 PROCEDURE — 80346 BENZODIAZEPINES1-12: CPT

## 2024-04-17 PROCEDURE — 3008F BODY MASS INDEX DOCD: CPT | Performed by: NURSE PRACTITIONER

## 2024-04-17 PROCEDURE — 80373 DRUG SCREENING TRAMADOL: CPT

## 2024-04-17 PROCEDURE — 80354 DRUG SCREENING FENTANYL: CPT

## 2024-04-17 PROCEDURE — 87205 SMEAR GRAM STAIN: CPT

## 2024-04-17 PROCEDURE — 87491 CHLMYD TRACH DNA AMP PROBE: CPT

## 2024-04-17 PROCEDURE — 82270 OCCULT BLOOD FECES: CPT | Performed by: NURSE PRACTITIONER

## 2024-04-17 PROCEDURE — 82570 ASSAY OF URINE CREATININE: CPT

## 2024-04-17 PROCEDURE — 80349 CANNABINOIDS NATURAL: CPT

## 2024-04-17 PROCEDURE — 99215 OFFICE O/P EST HI 40 MIN: CPT | Performed by: NURSE PRACTITIONER

## 2024-04-17 PROCEDURE — 80307 DRUG TEST PRSMV CHEM ANLYZR: CPT

## 2024-04-17 PROCEDURE — 80365 DRUG SCREENING OXYCODONE: CPT

## 2024-04-17 PROCEDURE — 87591 N.GONORRHOEAE DNA AMP PROB: CPT

## 2024-04-17 PROCEDURE — 80368 SEDATIVE HYPNOTICS: CPT

## 2024-04-17 ASSESSMENT — ANXIETY QUESTIONNAIRES
4. TROUBLE RELAXING: SEVERAL DAYS
2. NOT BEING ABLE TO STOP OR CONTROL WORRYING: SEVERAL DAYS
GAD7 TOTAL SCORE: 7
6. BECOMING EASILY ANNOYED OR IRRITABLE: MORE THAN HALF THE DAYS
5. BEING SO RESTLESS THAT IT IS HARD TO SIT STILL: SEVERAL DAYS
3. WORRYING TOO MUCH ABOUT DIFFERENT THINGS: SEVERAL DAYS
1. FEELING NERVOUS, ANXIOUS, OR ON EDGE: SEVERAL DAYS
IF YOU CHECKED OFF ANY PROBLEMS ON THIS QUESTIONNAIRE, HOW DIFFICULT HAVE THESE PROBLEMS MADE IT FOR YOU TO DO YOUR WORK, TAKE CARE OF THINGS AT HOME, OR GET ALONG WITH OTHER PEOPLE: SOMEWHAT DIFFICULT
7. FEELING AFRAID AS IF SOMETHING AWFUL MIGHT HAPPEN: NOT AT ALL

## 2024-04-17 ASSESSMENT — PATIENT HEALTH QUESTIONNAIRE - PHQ9
10. IF YOU CHECKED OFF ANY PROBLEMS, HOW DIFFICULT HAVE THESE PROBLEMS MADE IT FOR YOU TO DO YOUR WORK, TAKE CARE OF THINGS AT HOME, OR GET ALONG WITH OTHER PEOPLE: SOMEWHAT DIFFICULT
1. LITTLE INTEREST OR PLEASURE IN DOING THINGS: SEVERAL DAYS
SUM OF ALL RESPONSES TO PHQ9 QUESTIONS 1 AND 2: 2
2. FEELING DOWN, DEPRESSED OR HOPELESS: SEVERAL DAYS

## 2024-04-17 NOTE — PROGRESS NOTES
Problem List Items Addressed This Visit       Anxiety and depression    Overview     Started 6/2021 after dx with MS  Genesight testing 8/21: Zoloft causes moderate gene-drug interactions, Wellbutrin and Selegiline cause severe gene-drug interactions  Lexapro caused increased appetite  Prozac caused  burning mouth at 20mg  On Cymbalta  Using Ativan 3x/day         Relevant Orders    Opiate/Opioid/Benzo Prescription Compliance    OOB Internal Tracking    H/O abnormal cervical Papanicolaou smear    Overview     LGSIL/HPV(+)  s/p colpo 2017: FINAL DIAGNOSIS 1. Endocervix, curettage (A) - Minute strips of benign   endocervical epithelium. 2. Endocervix, 12 o'clock, biopsy (B) - Benign   squamous mucosa, negative for dysplasia. BY/GH/rw 08/01/2017         Medication management    Overview     UDS, signed controlled substance contract, and OARRS check all up to date  Q3m  10/27/23: no longer using MM;  compliance guidelines discussed          Relevant Orders    Opiate/Opioid/Benzo Prescription Compliance    OOB Internal Tracking     Other Visit Diagnoses       Pelvic pain    -  Primary    unclear etiology  pap sent  eswab sent  check labs - remote hx blood in stool  occult stool neg today  US transabd/transvag   further POC with results    Relevant Orders    THINPREP PAP TEST    Urinalysis with Reflex Culture and Microscopic    US PELVIS TRANSABDOMINAL WITH TRANSVAGINAL    CBC and Auto Differential    Basic metabolic panel    POCT Fecal occult blood-guiac methodology screening manually resulted (Completed)    Screening for cervical cancer        last pap 2020 ASCUS/HPV neg  sent today with addition GC/CT/trich add-on    Relevant Orders    THINPREP PAP TEST    Vaginal discharge        r/o BV/yeast    Relevant Orders    Vaginitis Gram Stain For Bacterial Vaginosis + Yeast             Subjective   Patient ID: Andreina Gil is a 38 y.o. female who presents for Abdominal Pain (Rectal / abd pain csv/Can't tell what it is on  "left side intermittant had IUD removed/Going on for over a month/Having lower back pain when she wakes up).  HPI  Rectal pain  Abd pain  ? Perianal  \"Uterus\"  Feels like it's on the R side   Symptoms for well over a month   Pain feels inside  Aching  Dull  Comes & goes in waves   Exacerbated by: standing, moving around  Relieved by: relaxing, tylenol, motrin  Worst 2-3/10    Cscope early 20s dx rectal bleeding  - recalls normal    IUD removed about a month ago  3/18/24 Dr Badillo copied:  IUD removed without difficulty, intact, and patient tolerated procedure well.  Contraception plans: none    Taiwo Badillo Jr, MD     No contraception at this time  Infrequent sexual activity  Last back in March    LMP 4/9/24  Lasted almost a week  Not as heavy as usual  Always irregular  Cycles approx monthly  Spotting   Last delivery 6/29/21   No pregnancy losses after that     BM irregular  Constipation  Last yesterday  Constipation not new since starting wegovy  Last blood in stool months  - thinks from straining  This pain is not associated with Bms    Urinary sx  No incontinence, frequency or urgency  No recent fever  No hematuria  This pain is not associated with urination    10/2022 US:   Impression   Anteverted uterus. Normal endometrial thickness. Normal endometrial contour and   correctly positioned IUD on 3D imaging. Strings are visible in the endocervix.   Myometrium is normal.   Right ovary is normal.   Left ovary is normal.   Cul-de-sac is normal with no free fluid.     Recommendations   Normal pelvic ultrasound with IUD in place.     Component      Latest Ref Rng 10/4/2023   LEUKOCYTES (10*3/UL) IN BLOOD BY AUTOMATED COUNT, Citizen of [place]      4.4 - 11.3 x10*3/uL 7.5    nRBC      0.0 - 0.0 /100 WBCs 0.0    ERYTHROCYTES (10*6/UL) IN BLOOD BY AUTOMATED COUNT, Citizen of [place]      4.00 - 5.20 x10*6/uL 4.35    HEMOGLOBIN      12.0 - 16.0 g/dL 13.2    HEMATOCRIT      36.0 - 46.0 % 41.3    MCV      80 - 100 fL 95    MCH      26.0 - 34.0 " pg 30.3    MCHC      32.0 - 36.0 g/dL 32.0    RED CELL DISTRIBUTION WIDTH      11.5 - 14.5 % 13.5    PLATELETS (10*3/UL) IN BLOOD AUTOMATED COUNT, [place]ian      150 - 450 x10*3/uL 365    MEAN PLATELET VOLUME      7.5 - 11.5 fL 10.5    NEUTROPHILS/100 LEUKOCYTES IN BLOOD BY AUTOMATED COUNT, [place]      40.0 - 80.0 % 55.1    Immature Granulocytes %, Automated      0.0 - 0.9 % 0.1    Lymphocytes %      13.0 - 44.0 % 30.0    Monocytes %      2.0 - 10.0 % 12.7    Eosinophils %      0.0 - 6.0 % 0.9    Basophils %      0.0 - 2.0 % 1.2    NEUTROPHILS (10*3/UL) IN BLOOD BY AUTOMATED COUNT, [place]      1.20 - 7.70 x10*3/uL 4.10    Immature Granulocytes Absolute, Automated      0.00 - 0.70 x10*3/uL 0.01    Lymphocytes Absolute      1.20 - 4.80 x10*3/uL 2.24    Monocytes Absolute      0.10 - 1.00 x10*3/uL 0.95    Eosinophils Absolute      0.00 - 0.70 x10*3/uL 0.07    Basophils Absolute      0.00 - 0.10 x10*3/uL 0.09    GLUCOSE      74 - 99 mg/dL 85    SODIUM      136 - 145 mmol/L 135 (L)    POTASSIUM      3.5 - 5.3 mmol/L 4.6    CHLORIDE      98 - 107 mmol/L 103    Bicarbonate      21 - 32 mmol/L 26    Anion Gap      10 - 20 mmol/L 11    Blood Urea Nitrogen      6 - 23 mg/dL 17    Creatinine      0.50 - 1.05 mg/dL 0.79    EGFR      >60 mL/min/1.73m*2 >90    Calcium      8.6 - 10.3 mg/dL 9.8    IRON      35 - 150 ug/dL 88    UIBC      110 - 370 ug/dL 349    TIBC      240 - 445 ug/dL 437    % Saturation      25 - 45 % 20 (L)    Vitamin D, 25-Hydroxy, Total      30 - 100 ng/mL 65    MAGNESIUM      1.60 - 2.40 mg/dL 1.91    Thyroxine, Free      0.78 - 1.48 ng/dL 0.83    Triiodothyronine, Free      2.3 - 4.2 pg/mL 3.0    Thyroid Stimulating Hormone      0.44 - 3.98 mIU/L 1.73    LIPASE      9 - 82 U/L 44    AMYLASE      29 - 103 U/L      Component      Latest Ref Rng 2/26/2024   LEUKOCYTES (10*3/UL) IN BLOOD BY AUTOMATED COUNT, [place]ian      4.4 - 11.3 x10*3/uL    nRBC      0.0 - 0.0 /100 WBCs    ERYTHROCYTES (10*6/UL) IN BLOOD  BY AUTOMATED COUNT, [place]      4.00 - 5.20 x10*6/uL    HEMOGLOBIN      12.0 - 16.0 g/dL    HEMATOCRIT      36.0 - 46.0 %    MCV      80 - 100 fL    MCH      26.0 - 34.0 pg    MCHC      32.0 - 36.0 g/dL    RED CELL DISTRIBUTION WIDTH      11.5 - 14.5 %    PLATELETS (10*3/UL) IN BLOOD AUTOMATED COUNT, [place]      150 - 450 x10*3/uL    MEAN PLATELET VOLUME      7.5 - 11.5 fL    NEUTROPHILS/100 LEUKOCYTES IN BLOOD BY AUTOMATED COUNT, [place]      40.0 - 80.0 %    Immature Granulocytes %, Automated      0.0 - 0.9 %    Lymphocytes %      13.0 - 44.0 %    Monocytes %      2.0 - 10.0 %    Eosinophils %      0.0 - 6.0 %    Basophils %      0.0 - 2.0 %    NEUTROPHILS (10*3/UL) IN BLOOD BY AUTOMATED COUNT, [place]      1.20 - 7.70 x10*3/uL    Immature Granulocytes Absolute, Automated      0.00 - 0.70 x10*3/uL    Lymphocytes Absolute      1.20 - 4.80 x10*3/uL    Monocytes Absolute      0.10 - 1.00 x10*3/uL    Eosinophils Absolute      0.00 - 0.70 x10*3/uL    Basophils Absolute      0.00 - 0.10 x10*3/uL    GLUCOSE      74 - 99 mg/dL 73 (L)    SODIUM      136 - 145 mmol/L 138    POTASSIUM      3.5 - 5.3 mmol/L 4.7    CHLORIDE      98 - 107 mmol/L 105    Bicarbonate      21 - 32 mmol/L 26    Anion Gap      10 - 20 mmol/L 12    Blood Urea Nitrogen      6 - 23 mg/dL 15    Creatinine      0.50 - 1.05 mg/dL 0.77    EGFR      >60 mL/min/1.73m*2 >90    Calcium      8.6 - 10.3 mg/dL 9.5    IRON      35 - 150 ug/dL    UIBC      110 - 370 ug/dL    TIBC      240 - 445 ug/dL    % Saturation      25 - 45 %    Vitamin D, 25-Hydroxy, Total      30 - 100 ng/mL    MAGNESIUM      1.60 - 2.40 mg/dL    Thyroxine, Free      0.78 - 1.48 ng/dL    Triiodothyronine, Free      2.3 - 4.2 pg/mL    Thyroid Stimulating Hormone      0.44 - 3.98 mIU/L    LIPASE      9 - 82 U/L 37    AMYLASE      29 - 103 U/L 39         8/2021:  (IUD placed)  HPV Type 16 Negative for HPV DNA high risk type 16 by PCR.   HPV Type 18 Negative for HPV DNA high risk type 18 by  PCR.   HPV High Risk Other Negative for HPV DNA high risk types: 31,33,35,39,45,51,52,56,58,59,66,68 by PCR.     Last pap 2020: ASCUS, HPV (-)     Controlled Substance Visit:  I have personally reviewed the OARRS report and have considered the risks of abuse, dependence, addiction and diversion and I believe that it is clinically appropriate for the patient to be prescribed this medication.    Is the patient prescribed a combination of a benzodiazepine and opioid?  No    Last Urine Drug Screen / ordered today: Yes  No results found for this or any previous visit (from the past 8760 hour(s)).  N/A    Controlled Substance Agreement:  Date of the Last Agreement: 23 reviewed on 24  I have reviewed each line item on the Controlled Substance Agreement including, but not limited to, the benefits, risks, and alternatives to treatment with a Controlled Substance medication(s). The patient has verbalized understanding and signed the agreement.    Benzodiazepines:  What is the patient's goal of therapy? anxiety  Is this being achieved with current treatment? yes    BRIANNE-7:  Over the last 2 weeks, how often have you been bothered by any of the following problems?  Feeling nervous, anxious, or on edge: 1  Not being able to stop or control worryin  Worrying too much about different things: 1  Trouble relaxin  Being so restless that it is hard to sit still: 1  Becoming easily annoyed or irritable: 2  Feeling afraid as if something awful might happen: 0  BRIANNE-7 Total Score: 7        Activities of Daily Living:   Is your overall impression that this patient is benefiting (symptom reduction outweighs side effects) from benzodiazepine therapy? Yes     1. Physical Functioning: Same  2. Family Relationship: Same  3. Social Relationship: Same  4. Mood: Same  5. Sleep Patterns: Same  6. Overall Function: Same    Review of Systems   All other systems reviewed and are negative.      BP Readings from Last 3 Encounters:  "  04/17/24 128/84   04/07/23 135/88   06/06/22 (!) 132/92      Wt Readings from Last 3 Encounters:   04/17/24 81.9 kg (180 lb 9.6 oz)   02/09/24 83.5 kg (184 lb)   09/29/23 89.4 kg (197 lb)      BMI:   Estimated body mass index is 26.67 kg/m² as calculated from the following:    Height as of 2/9/24: 1.753 m (5' 9\").    Weight as of this encounter: 81.9 kg (180 lb 9.6 oz).    Objective   Physical Exam  Exam conducted with a chaperone present (Sandra).   Constitutional:       General: She is not in acute distress.  HENT:      Head: Normocephalic and atraumatic.      Right Ear: External ear normal.      Left Ear: External ear normal.      Nose: Nose normal.      Mouth/Throat:      Mouth: Mucous membranes are moist.   Eyes:      Extraocular Movements: Extraocular movements intact.      Conjunctiva/sclera: Conjunctivae normal.   Cardiovascular:      Rate and Rhythm: Normal rate and regular rhythm.      Pulses: Normal pulses.   Pulmonary:      Effort: Pulmonary effort is normal.      Breath sounds: Normal breath sounds.   Abdominal:      General: Bowel sounds are normal. There is no distension.      Palpations: Abdomen is soft. There is no mass.      Tenderness: There is abdominal tenderness. There is no right CVA tenderness, left CVA tenderness, guarding or rebound.      Comments: RLQ and over R ovary tender. Also tender over bladder    Genitourinary:     Labia:         Right: No rash, tenderness, lesion or injury.         Left: No rash, tenderness, lesion or injury.       Comments: She is tender anterior with manual exam   Musculoskeletal:         General: Normal range of motion.      Cervical back: Normal range of motion and neck supple.   Lymphadenopathy:      Lower Body: No right inguinal adenopathy. No left inguinal adenopathy.   Skin:     General: Skin is warm and dry.   Neurological:      General: No focal deficit present.      Mental Status: She is alert.   Psychiatric:         Mood and Affect: Mood normal.          "

## 2024-04-18 LAB
C TRACH RRNA SPEC QL NAA+PROBE: NEGATIVE
CLUE CELLS VAG LPF-#/AREA: NORMAL /[LPF]
N GONORRHOEA DNA SPEC QL PROBE+SIG AMP: NEGATIVE
NUGENT SCORE: 1
T VAGINALIS RRNA SPEC QL NAA+PROBE: NEGATIVE
YEAST VAG WET PREP-#/AREA: NORMAL

## 2024-04-22 LAB — CARBOXYTHC UR-MCNC: 79 NG/ML

## 2024-04-23 ENCOUNTER — HOSPITAL ENCOUNTER (OUTPATIENT)
Dept: RADIOLOGY | Facility: CLINIC | Age: 39
Discharge: HOME | End: 2024-04-23
Payer: COMMERCIAL

## 2024-04-23 DIAGNOSIS — R10.2 PELVIC PAIN: ICD-10-CM

## 2024-04-23 LAB
1OH-MIDAZOLAM UR CFM-MCNC: <25 NG/ML
6MAM UR CFM-MCNC: <25 NG/ML
7AMINOCLONAZEPAM UR CFM-MCNC: <25 NG/ML
A-OH ALPRAZ UR CFM-MCNC: <25 NG/ML
ALPRAZ UR CFM-MCNC: <25 NG/ML
CHLORDIAZEP UR CFM-MCNC: <25 NG/ML
CLONAZEPAM UR CFM-MCNC: <25 NG/ML
CODEINE UR CFM-MCNC: <50 NG/ML
DIAZEPAM UR CFM-MCNC: <25 NG/ML
EDDP UR CFM-MCNC: <25 NG/ML
FENTANYL UR CFM-MCNC: <2.5 NG/ML
HYDROCODONE CTO UR CFM-MCNC: <25 NG/ML
HYDROMORPHONE UR CFM-MCNC: <25 NG/ML
LORAZEPAM UR CFM-MCNC: >1000 NG/ML
METHADONE UR CFM-MCNC: <25 NG/ML
MIDAZOLAM UR CFM-MCNC: <25 NG/ML
MORPHINE UR CFM-MCNC: <50 NG/ML
NORDIAZEPAM UR CFM-MCNC: <25 NG/ML
NORFENTANYL UR CFM-MCNC: <2.5 NG/ML
NORHYDROCODONE UR CFM-MCNC: <25 NG/ML
NOROXYCODONE UR CFM-MCNC: <25 NG/ML
NORTRAMADOL UR-MCNC: <50 NG/ML
OXAZEPAM UR CFM-MCNC: <25 NG/ML
OXYCODONE UR CFM-MCNC: <25 NG/ML
OXYMORPHONE UR CFM-MCNC: <25 NG/ML
TEMAZEPAM UR CFM-MCNC: <25 NG/ML
TRAMADOL UR CFM-MCNC: <50 NG/ML
ZOLPIDEM UR CFM-MCNC: <25 NG/ML
ZOLPIDEM UR-MCNC: <25 NG/ML

## 2024-04-23 PROCEDURE — 76856 US EXAM PELVIC COMPLETE: CPT

## 2024-04-23 PROCEDURE — 76830 TRANSVAGINAL US NON-OB: CPT | Performed by: RADIOLOGY

## 2024-04-23 PROCEDURE — 76856 US EXAM PELVIC COMPLETE: CPT | Performed by: RADIOLOGY

## 2024-04-25 DIAGNOSIS — N83.9 LESION OF LEFT OVARY: Primary | ICD-10-CM

## 2024-04-25 DIAGNOSIS — R93.89 ENDOMETRIAL THICKENING ON ULTRASOUND: ICD-10-CM

## 2024-04-25 LAB
CYTOLOGY CMNT CVX/VAG CYTO-IMP: NORMAL
HPV HR 12 DNA GENITAL QL NAA+PROBE: NEGATIVE
HPV HR GENOTYPES PNL CVX NAA+PROBE: NEGATIVE
HPV16 DNA SPEC QL NAA+PROBE: NEGATIVE
HPV18 DNA SPEC QL NAA+PROBE: NEGATIVE
LAB AP HPV GENOTYPE QUESTION: YES
LAB AP HPV HR: NORMAL
LAB AP PAP ADDITIONAL TESTS: NORMAL
LABORATORY COMMENT REPORT: NORMAL
PATH REPORT.TOTAL CANCER: NORMAL

## 2024-04-26 ENCOUNTER — HOSPITAL ENCOUNTER (OUTPATIENT)
Dept: RADIOLOGY | Facility: CLINIC | Age: 39
End: 2024-04-26
Payer: COMMERCIAL

## 2024-06-10 DIAGNOSIS — Z32.01 PREGNANCY CONFIRMED BY POSITIVE URINE TEST (HHS-HCC): Primary | ICD-10-CM

## 2024-07-29 ENCOUNTER — APPOINTMENT (OUTPATIENT)
Dept: PRIMARY CARE | Facility: CLINIC | Age: 39
End: 2024-07-29
Payer: COMMERCIAL

## 2024-08-05 ENCOUNTER — APPOINTMENT (OUTPATIENT)
Dept: PRIMARY CARE | Facility: CLINIC | Age: 39
End: 2024-08-05
Payer: COMMERCIAL

## 2024-08-05 VITALS
TEMPERATURE: 97.8 F | OXYGEN SATURATION: 98 % | WEIGHT: 179 LBS | HEART RATE: 97 BPM | BODY MASS INDEX: 26.51 KG/M2 | HEIGHT: 69 IN | DIASTOLIC BLOOD PRESSURE: 82 MMHG | SYSTOLIC BLOOD PRESSURE: 116 MMHG

## 2024-08-05 DIAGNOSIS — Z79.899 MEDICATION MANAGEMENT: ICD-10-CM

## 2024-08-05 DIAGNOSIS — F41.9 ANXIETY AND DEPRESSION: ICD-10-CM

## 2024-08-05 DIAGNOSIS — F32.A ANXIETY AND DEPRESSION: ICD-10-CM

## 2024-08-05 DIAGNOSIS — R63.5 WEIGHT GAIN: ICD-10-CM

## 2024-08-05 DIAGNOSIS — G35 MULTIPLE SCLEROSIS (MULTI): Primary | ICD-10-CM

## 2024-08-05 DIAGNOSIS — E66.3 OVERWEIGHT WITH BODY MASS INDEX (BMI) OF 26 TO 26.9 IN ADULT: ICD-10-CM

## 2024-08-05 DIAGNOSIS — F51.04 CHRONIC INSOMNIA: ICD-10-CM

## 2024-08-05 PROCEDURE — 80358 DRUG SCREENING METHADONE: CPT

## 2024-08-05 PROCEDURE — 80346 BENZODIAZEPINES1-12: CPT

## 2024-08-05 PROCEDURE — 80368 SEDATIVE HYPNOTICS: CPT

## 2024-08-05 PROCEDURE — 99214 OFFICE O/P EST MOD 30 MIN: CPT | Performed by: NURSE PRACTITIONER

## 2024-08-05 PROCEDURE — 3008F BODY MASS INDEX DOCD: CPT | Performed by: NURSE PRACTITIONER

## 2024-08-05 PROCEDURE — 80373 DRUG SCREENING TRAMADOL: CPT

## 2024-08-05 PROCEDURE — 80361 OPIATES 1 OR MORE: CPT

## 2024-08-05 PROCEDURE — 80365 DRUG SCREENING OXYCODONE: CPT

## 2024-08-05 PROCEDURE — 1036F TOBACCO NON-USER: CPT | Performed by: NURSE PRACTITIONER

## 2024-08-05 PROCEDURE — 80354 DRUG SCREENING FENTANYL: CPT

## 2024-08-05 PROCEDURE — 80307 DRUG TEST PRSMV CHEM ANLYZR: CPT

## 2024-08-05 PROCEDURE — 82570 ASSAY OF URINE CREATININE: CPT

## 2024-08-05 RX ORDER — SEMAGLUTIDE 0.25 MG/.5ML
0.25 INJECTION, SOLUTION SUBCUTANEOUS
Qty: 2 ML | Refills: 0 | Status: SHIPPED | COMMUNITY
Start: 2024-08-11 | End: 2024-09-02

## 2024-08-05 RX ORDER — SEMAGLUTIDE 0.5 MG/.5ML
0.5 INJECTION, SOLUTION SUBCUTANEOUS
Qty: 2 ML | Refills: 0 | Status: SHIPPED | OUTPATIENT
Start: 2024-08-11

## 2024-08-05 ASSESSMENT — ANXIETY QUESTIONNAIRES
6. BECOMING EASILY ANNOYED OR IRRITABLE: SEVERAL DAYS
5. BEING SO RESTLESS THAT IT IS HARD TO SIT STILL: NOT AT ALL
1. FEELING NERVOUS, ANXIOUS, OR ON EDGE: SEVERAL DAYS
GAD7 TOTAL SCORE: 6
7. FEELING AFRAID AS IF SOMETHING AWFUL MIGHT HAPPEN: SEVERAL DAYS
4. TROUBLE RELAXING: SEVERAL DAYS
3. WORRYING TOO MUCH ABOUT DIFFERENT THINGS: SEVERAL DAYS
2. NOT BEING ABLE TO STOP OR CONTROL WORRYING: SEVERAL DAYS
IF YOU CHECKED OFF ANY PROBLEMS ON THIS QUESTIONNAIRE, HOW DIFFICULT HAVE THESE PROBLEMS MADE IT FOR YOU TO DO YOUR WORK, TAKE CARE OF THINGS AT HOME, OR GET ALONG WITH OTHER PEOPLE: SOMEWHAT DIFFICULT

## 2024-08-05 ASSESSMENT — PATIENT HEALTH QUESTIONNAIRE - PHQ9
1. LITTLE INTEREST OR PLEASURE IN DOING THINGS: NOT AT ALL
2. FEELING DOWN, DEPRESSED OR HOPELESS: NOT AT ALL
SUM OF ALL RESPONSES TO PHQ9 QUESTIONS 1 AND 2: 0

## 2024-08-05 NOTE — PROGRESS NOTES
Problem List Items Addressed This Visit       Anxiety and depression    Overview     Started 6/2021 after dx with MS  Genesight testing 8/21: Zoloft causes moderate gene-drug interactions, Wellbutrin and Selegiline cause severe gene-drug interactions  Lexapro caused increased appetite  Prozac caused  burning mouth at 20mg  On Cymbalta  Using Ativan 3x/day         Relevant Orders    Opiate/Opioid/Benzo Prescription Compliance    OOB Internal Tracking    Chronic insomnia    Overview     Ambien didnt work  Unisom helps for few hours  IV Benadryl worked  Trazodone may be interacting with her SSRI, stopped 9/21  Using Ativan at HS         Relevant Orders    Opiate/Opioid/Benzo Prescription Compliance    OOB Internal Tracking    Medication management    Overview     UDS, signed controlled substance contract, and OARRS check all up to date  Q3m  10/27/23: no longer using MM;  compliance guidelines discussed          Current Assessment & Plan     UDS today  Ok for refills with negative results         Relevant Orders    Opiate/Opioid/Benzo Prescription Compliance    OOB Internal Tracking    Multiple sclerosis (Multi) - Primary    Overview     dx 6/2021   Baclofen prn and Ativan for muscle spasms and dizziness associated with her disease  On Ocrevus, started in August 2021.  [facility] manages  Started GLP1 med 'off label' in 9/23 (thought to be beneficial for MS): The GLP-1 receptor agonist may also be effective in reducing leukocyte invasion into tissues of the CNS and reducing the destruction of the underlying neuronal tissues of the CNS, and thus ameliorating the clinical manifestations of multiple sclerosis. Aware of R&B of this.         Relevant Medications    semaglutide, weight loss, (Wegovy) 0.25 mg/0.5 mL pen injector (Start on 8/11/2024)    semaglutide, weight loss, (Wegovy) 0.5 mg/0.5 mL pen injector (Start on 8/11/2024)    Overweight with body mass index (BMI) of 26 to 26.9 in adult    Overview      Discussed exercise/PT and diet  MS provider cleared her for semaglutide  The GLP-1 receptor agonist may also be effective in reducing leukocyte invasion into tissues of the CNS and reducing the destruction of the underlying neuronal tissues of the CNS, and thus ameliorating the clinical manifestations of multiple sclerosis   No hx pancreatitis, personal/family hx MTC  9/29/23: started wegovy  (supply issues caused us to use ozempic samples to bridge therapy)  8/5/24: since stopping wegovy 6/2024, has gained back 9 lbs in spite of exercise as tolerated and My[employer]itnessPal calorie def; restart wegovy 0.25 mg today           Current Assessment & Plan     Anticipating this will require PA, I've sent wegovy 0.5 mg weekly dose to pharmacy to initiate PA process          Relevant Medications    semaglutide, weight loss, (Wegovy) 0.25 mg/0.5 mL pen injector (Start on 8/11/2024)    semaglutide, weight loss, (Wegovy) 0.5 mg/0.5 mL pen injector (Start on 8/11/2024)     Other Visit Diagnoses       Weight gain        Relevant Medications    semaglutide, weight loss, (Wegovy) 0.25 mg/0.5 mL pen injector (Start on 8/11/2024)    semaglutide, weight loss, (Wegovy) 0.5 mg/0.5 mL pen injector (Start on 8/11/2024)             Subjective   Patient ID: Andreina Gil is a 39 y.o. female who presents for New Med Request (Wegovy ).  HPI  Wegovy was stopped with pregnancy  Last wegovy 6/1/24  - weight was down to 170 lbs at that time   LMP   Pregnancy plans   Miscarried, d&c  Body mass index is 26.24 kg/m².  Starting weight   7/2023 = 202 lb  9/29/23 = 197 lb  Lost 34 lbs while on wegovy  Was down to 170 lbs (home scale)   Since that time has gained back 9 lbs  Has increased exercise & activity  - is limited to duration and intensity of exercise d/t MS   Using MyFitnessPal to count calories  - trying to stay in calorie deficit     Controlled Substance Visit:  I have personally reviewed the OARRS report and have considered the risks of abuse,  dependence, addiction and diversion and I believe that it is clinically appropriate for the patient to be prescribed this medication.    Is the patient prescribed a combination of a benzodiazepine and opioid?  No    Last Urine Drug Screen / ordered today: Yes  Recent Results (from the past 8760 hour(s))   Confirmation Opiate/Opioid/Benzo Prescription Compliance    Collection Time: 04/17/24 11:47 AM   Result Value Ref Range    Clonazepam <25 <25 ng/mL    7-Aminoclonazepam <25 <25 ng/mL    Alprazolam <25 <25 ng/mL    Alpha-Hydroxyalprazolam <25 <25 ng/mL    Midazolam <25 <25 ng/mL    Alpha-Hydroxymidazolam <25 <25 ng/mL    Chlordiazepoxide <25 <25 ng/mL    Diazepam <25 <25 ng/mL    Nordiazepam <25 <25 ng/mL    Temazepam <25 <25 ng/mL    Oxazepam <25 <25 ng/mL    Lorazepam >1,000 (H) <25 ng/mL    Methadone <25 <25 ng/mL    EDDP <25 <25 ng/mL    6-Acetylmorphine <25 <25 ng/mL    Codeine <50 <50 ng/mL    Hydrocodone <25 <25 ng/mL    Hydromorphone <25 <25 ng/mL    Morphine  <50 <50 ng/mL    Norhydrocodone <25 <25 ng/mL    Noroxycodone <25 <25 ng/mL    Oxycodone <25 <25 ng/mL    Oxymorphone <25 <25 ng/mL    Fentanyl <2.5 <2.5 ng/mL    Norfentanyl <2.5 <2.5 ng/mL    Tramadol <50 <50 ng/mL    O-Desmethyltramadol <50 <50 ng/mL    Zolpidem <25 <25 ng/mL    Zolpidem Metabolite (ZCA) <25 <25 ng/mL   Screen Opiate/Opioid/Benzo Prescription Compliance    Collection Time: 04/17/24 11:47 AM   Result Value Ref Range    Creatinine, Urine Random 352.7 (H) 20.0 - 320.0 mg/dL    Amphetamine Screen, Urine Presumptive Negative Presumptive Negative    Barbiturate Screen, Urine Presumptive Negative Presumptive Negative    Cannabinoid Screen, Urine Presumptive Positive (A) Presumptive Negative    Cocaine Metabolite Screen, Urine Presumptive Negative Presumptive Negative    PCP Screen, Urine Presumptive Negative Presumptive Negative     Results are as expected.     Controlled Substance Agreement:  Date of the Last Agreement: 4/7/23; reviewed  "24  I have reviewed each line item on the Controlled Substance Agreement including, but not limited to, the benefits, risks, and alternatives to treatment with a Controlled Substance medication(s). The patient has verbalized understanding and signed the agreement.    Benzodiazepines:  What is the patient's goal of therapy? Anxiety   Is this being achieved with current treatment? Yes     BRIANNE-7:  Over the last 2 weeks, how often have you been bothered by any of the following problems?  Feeling nervous, anxious, or on edge: 1  Not being able to stop or control worryin  Worrying too much about different things: 1  Trouble relaxin  Being so restless that it is hard to sit still: 0  Becoming easily annoyed or irritable: 1  Feeling afraid as if something awful might happen: 1  BRIANNE-7 Total Score: 6      Activities of Daily Living:   Is your overall impression that this patient is benefiting (symptom reduction outweighs side effects) from benzodiazepine therapy? Yes     1. Physical Functioning: Better  2. Family Relationship: Better  3. Social Relationship: Better  4. Mood: Better  5. Sleep Patterns: Better  6. Overall Function: Better    Review of Systems   All other systems reviewed and are negative.      BP Readings from Last 3 Encounters:   24 116/82   24 128/84   23 135/88      Wt Readings from Last 3 Encounters:   24 81.2 kg (179 lb)   24 81.9 kg (180 lb 9.6 oz)   24 83.5 kg (184 lb)      BMI:   Estimated body mass index is 26.24 kg/m² as calculated from the following:    Height as of this encounter: 1.759 m (5' 9.25\").    Weight as of this encounter: 81.2 kg (179 lb).    Objective   Physical Exam  Constitutional:       General: She is not in acute distress.     Appearance: Normal appearance.   HENT:      Head: Normocephalic and atraumatic.      Nose: Nose normal.      Mouth/Throat:      Mouth: Mucous membranes are moist.   Eyes:      Conjunctiva/sclera: Conjunctivae " normal.   Pulmonary:      Effort: Pulmonary effort is normal.   Musculoskeletal:         General: Normal range of motion.      Cervical back: Normal range of motion.   Skin:     Comments: No visible rash   Neurological:      General: No focal deficit present.      Mental Status: She is alert.   Psychiatric:         Mood and Affect: Mood normal.

## 2024-08-05 NOTE — ASSESSMENT & PLAN NOTE
Anticipating this will require PA, I've sent wegovy 0.5 mg weekly dose to pharmacy to initiate PA process

## 2024-08-06 DIAGNOSIS — F32.A ANXIETY AND DEPRESSION: ICD-10-CM

## 2024-08-06 DIAGNOSIS — F41.9 ANXIETY AND DEPRESSION: ICD-10-CM

## 2024-08-06 DIAGNOSIS — F51.04 CHRONIC INSOMNIA: ICD-10-CM

## 2024-08-06 LAB
AMPHETAMINES UR QL SCN: NORMAL
BARBITURATES UR QL SCN: NORMAL
BZE UR QL SCN: NORMAL
CANNABINOIDS UR QL SCN: NORMAL
CREAT UR-MCNC: 112.7 MG/DL (ref 20–320)
PCP UR QL SCN: NORMAL

## 2024-08-06 RX ORDER — LORAZEPAM 1 MG/1
1 TABLET ORAL 3 TIMES DAILY PRN
Qty: 90 TABLET | Refills: 0 | Status: SHIPPED | OUTPATIENT
Start: 2024-08-06

## 2024-08-08 LAB
1OH-MIDAZOLAM UR CFM-MCNC: <25 NG/ML
6MAM UR CFM-MCNC: <25 NG/ML
7AMINOCLONAZEPAM UR CFM-MCNC: <25 NG/ML
A-OH ALPRAZ UR CFM-MCNC: <25 NG/ML
ALPRAZ UR CFM-MCNC: <25 NG/ML
CHLORDIAZEP UR CFM-MCNC: <25 NG/ML
CLONAZEPAM UR CFM-MCNC: <25 NG/ML
CODEINE UR CFM-MCNC: <50 NG/ML
DIAZEPAM UR CFM-MCNC: <25 NG/ML
EDDP UR CFM-MCNC: <25 NG/ML
FENTANYL UR CFM-MCNC: <2.5 NG/ML
HYDROCODONE CTO UR CFM-MCNC: <25 NG/ML
HYDROMORPHONE UR CFM-MCNC: <25 NG/ML
LORAZEPAM UR CFM-MCNC: 972 NG/ML
METHADONE UR CFM-MCNC: <25 NG/ML
MIDAZOLAM UR CFM-MCNC: <25 NG/ML
MORPHINE UR CFM-MCNC: <50 NG/ML
NORDIAZEPAM UR CFM-MCNC: <25 NG/ML
NORFENTANYL UR CFM-MCNC: <2.5 NG/ML
NORHYDROCODONE UR CFM-MCNC: <25 NG/ML
NOROXYCODONE UR CFM-MCNC: <25 NG/ML
NORTRAMADOL UR-MCNC: <50 NG/ML
OXAZEPAM UR CFM-MCNC: <25 NG/ML
OXYCODONE UR CFM-MCNC: <25 NG/ML
OXYMORPHONE UR CFM-MCNC: <25 NG/ML
TEMAZEPAM UR CFM-MCNC: <25 NG/ML
TRAMADOL UR CFM-MCNC: <50 NG/ML
ZOLPIDEM UR CFM-MCNC: <25 NG/ML
ZOLPIDEM UR-MCNC: <25 NG/ML

## 2024-08-19 DIAGNOSIS — F41.9 ANXIETY AND DEPRESSION: ICD-10-CM

## 2024-08-19 DIAGNOSIS — F32.A ANXIETY AND DEPRESSION: ICD-10-CM

## 2024-09-03 DIAGNOSIS — F32.A ANXIETY AND DEPRESSION: ICD-10-CM

## 2024-09-03 DIAGNOSIS — F41.9 ANXIETY AND DEPRESSION: ICD-10-CM

## 2024-09-04 RX ORDER — DULOXETINE 40 MG/1
40 CAPSULE, DELAYED RELEASE ORAL DAILY
Qty: 90 CAPSULE | Refills: 3 | OUTPATIENT
Start: 2024-09-04

## 2024-09-04 RX ORDER — DULOXETINE 40 MG/1
40 CAPSULE, DELAYED RELEASE ORAL DAILY
Qty: 90 CAPSULE | Refills: 3 | Status: SHIPPED | OUTPATIENT
Start: 2024-09-04

## 2024-10-03 DIAGNOSIS — R63.5 WEIGHT GAIN: ICD-10-CM

## 2024-10-03 DIAGNOSIS — G35 MULTIPLE SCLEROSIS (MULTI): ICD-10-CM

## 2024-10-03 DIAGNOSIS — E66.3 OVERWEIGHT WITH BODY MASS INDEX (BMI) OF 26 TO 26.9 IN ADULT: ICD-10-CM

## 2024-10-03 RX ORDER — SEMAGLUTIDE 1.7 MG/.75ML
1.7 INJECTION, SOLUTION SUBCUTANEOUS WEEKLY
Qty: 3 ML | Refills: 0 | Status: SHIPPED | OUTPATIENT
Start: 2024-10-03 | End: 2024-10-25

## 2024-10-07 DIAGNOSIS — F51.04 CHRONIC INSOMNIA: ICD-10-CM

## 2024-10-07 DIAGNOSIS — F41.9 ANXIETY AND DEPRESSION: ICD-10-CM

## 2024-10-07 DIAGNOSIS — F32.A ANXIETY AND DEPRESSION: ICD-10-CM

## 2024-10-07 RX ORDER — LORAZEPAM 1 MG/1
1 TABLET ORAL 3 TIMES DAILY PRN
Qty: 90 TABLET | Refills: 0 | Status: SHIPPED | OUTPATIENT
Start: 2024-10-07 | End: 2024-11-06

## 2024-11-03 ENCOUNTER — PATIENT MESSAGE (OUTPATIENT)
Dept: PRIMARY CARE | Facility: CLINIC | Age: 39
End: 2024-11-03
Payer: COMMERCIAL

## 2024-11-03 DIAGNOSIS — G35 MULTIPLE SCLEROSIS (MULTI): ICD-10-CM

## 2024-11-03 DIAGNOSIS — R63.5 WEIGHT GAIN: ICD-10-CM

## 2024-11-03 DIAGNOSIS — E66.3 OVERWEIGHT WITH BODY MASS INDEX (BMI) OF 26 TO 26.9 IN ADULT: ICD-10-CM

## 2024-11-08 PROBLEM — N83.209 OVARIAN CYST: Status: ACTIVE | Noted: 2024-04-25

## 2024-11-19 RX ORDER — ASPIRIN 81 MG/1
81 TABLET ORAL
COMMUNITY
Start: 2024-06-17 | End: 2024-11-20 | Stop reason: ALTCHOICE

## 2024-11-20 ENCOUNTER — APPOINTMENT (OUTPATIENT)
Dept: PRIMARY CARE | Facility: CLINIC | Age: 39
End: 2024-11-20
Payer: COMMERCIAL

## 2024-11-20 DIAGNOSIS — Z79.899 MEDICATION MANAGEMENT: ICD-10-CM

## 2024-11-20 DIAGNOSIS — E66.3 OVERWEIGHT WITH BODY MASS INDEX (BMI) OF 26 TO 26.9 IN ADULT: ICD-10-CM

## 2024-11-20 DIAGNOSIS — F51.04 CHRONIC INSOMNIA: ICD-10-CM

## 2024-11-20 DIAGNOSIS — F41.9 ANXIETY AND DEPRESSION: Primary | ICD-10-CM

## 2024-11-20 DIAGNOSIS — F32.A ANXIETY AND DEPRESSION: Primary | ICD-10-CM

## 2024-11-20 DIAGNOSIS — G35 MULTIPLE SCLEROSIS (MULTI): ICD-10-CM

## 2024-11-20 PROCEDURE — 1036F TOBACCO NON-USER: CPT | Performed by: NURSE PRACTITIONER

## 2024-11-20 PROCEDURE — 99213 OFFICE O/P EST LOW 20 MIN: CPT | Performed by: NURSE PRACTITIONER

## 2024-11-20 RX ORDER — SEMAGLUTIDE 1.7 MG/.75ML
1.7 INJECTION, SOLUTION SUBCUTANEOUS WEEKLY
Qty: 3 ML | Refills: 0 | Status: SHIPPED | OUTPATIENT
Start: 2024-11-20 | End: 2024-12-12

## 2024-11-20 RX ORDER — LORAZEPAM 1 MG/1
TABLET ORAL
Qty: 120 TABLET | Refills: 0 | Status: SHIPPED | OUTPATIENT
Start: 2024-11-20

## 2024-11-20 ASSESSMENT — ANXIETY QUESTIONNAIRES
IF YOU CHECKED OFF ANY PROBLEMS ON THIS QUESTIONNAIRE, HOW DIFFICULT HAVE THESE PROBLEMS MADE IT FOR YOU TO DO YOUR WORK, TAKE CARE OF THINGS AT HOME, OR GET ALONG WITH OTHER PEOPLE: SOMEWHAT DIFFICULT
5. BEING SO RESTLESS THAT IT IS HARD TO SIT STILL: SEVERAL DAYS
6. BECOMING EASILY ANNOYED OR IRRITABLE: MORE THAN HALF THE DAYS
7. FEELING AFRAID AS IF SOMETHING AWFUL MIGHT HAPPEN: SEVERAL DAYS
1. FEELING NERVOUS, ANXIOUS, OR ON EDGE: MORE THAN HALF THE DAYS
2. NOT BEING ABLE TO STOP OR CONTROL WORRYING: MORE THAN HALF THE DAYS
GAD7 TOTAL SCORE: 11
3. WORRYING TOO MUCH ABOUT DIFFERENT THINGS: MORE THAN HALF THE DAYS
4. TROUBLE RELAXING: SEVERAL DAYS

## 2024-11-20 ASSESSMENT — PATIENT HEALTH QUESTIONNAIRE - PHQ9
2. FEELING DOWN, DEPRESSED OR HOPELESS: SEVERAL DAYS
1. LITTLE INTEREST OR PLEASURE IN DOING THINGS: SEVERAL DAYS
10. IF YOU CHECKED OFF ANY PROBLEMS, HOW DIFFICULT HAVE THESE PROBLEMS MADE IT FOR YOU TO DO YOUR WORK, TAKE CARE OF THINGS AT HOME, OR GET ALONG WITH OTHER PEOPLE: SOMEWHAT DIFFICULT
SUM OF ALL RESPONSES TO PHQ9 QUESTIONS 1 AND 2: 2

## 2024-11-20 NOTE — PROGRESS NOTES
Problem List Items Addressed This Visit          Medium    Anxiety and depression - Primary    Overview     Started 6/2021 after dx with MS  Genesight testing 8/21: Zoloft causes moderate gene-drug interactions, Wellbutrin and Selegiline cause severe gene-drug interactions  Lexapro caused increased appetite  Prozac caused  burning mouth at 20mg  On Cymbalta  Using Ativan 3x/day  11/20/24: father recently passed. Short term increase to 1 mg am, 1 mg afternoon, 2 mg HS         Relevant Medications    LORazepam (Ativan) 1 mg tablet    Chronic insomnia    Overview     Ambien didnt work  Unisom helps for few hours  IV Benadryl worked  Trazodone may be interacting with her SSRI, stopped 9/21  Using Ativan at HS         Relevant Medications    LORazepam (Ativan) 1 mg tablet    Medication management    Overview     UDS, signed controlled substance contract, and [place]ARRS check all up to date  Q3m  10/27/23: no longer using MM;  compliance guidelines discussed          Multiple sclerosis (Multi)    Overview     dx 6/2021   Baclofen prn and Ativan for muscle spasms and dizziness associated with her disease  On Ocrevus, started in August 2021.  [facility] manages  Started GLP1 med 'off label' in 9/23 (thought to be beneficial for MS): The GLP-1 receptor agonist may also be effective in reducing leukocyte invasion into tissues of the CNS and reducing the destruction of the underlying neuronal tissues of the CNS, and thus ameliorating the clinical manifestations of multiple sclerosis. Aware of R&B of this.         Relevant Medications    semaglutide, weight loss, (Wegovy) 1.7 mg/0.75 mL pen injector    Overweight with body mass index (BMI) of 26 to 26.9 in adult    Overview     Discussed exercise/PT and diet  MS provider cleared her for semaglutide  The GLP-1 receptor agonist may also be effective in reducing leukocyte invasion into tissues of the CNS and reducing the destruction of the underlying neuronal tissues of the CNS,  and thus ameliorating the clinical manifestations of multiple sclerosis   No hx pancreatitis, personal/family hx MTC  9/29/23: started wegovy  (supply issues caused us to use ozempic samples to bridge therapy)  8/5/24: since stopping wegovy 6/2024, has gained back 9 lbs in spite of exercise as tolerated and [employer]itness[employer] calorie def; restart wegovy 0.25 mg today  11/20/24: plan is to complete wegovy 1 mg (3 more doses), then increase to 1.7 mg dose (RX sent with anticipation of PA)           Relevant Medications    semaglutide, weight loss, (Wegovy) 1.7 mg/0.75 mL pen injector      Weight Loss    Controlled Substance Visit:  I have personally reviewed the [place]ARRS report and have considered the risks of abuse, dependence, addiction and diversion and I believe that it is clinically appropriate for the patient to be prescribed this medication.    Is the patient prescribed a combination of a benzodiazepine and opioid?  No    Last Urine Drug Screen / ordered today: No  Recent Results (from the past 8760 hours)   Confirmation Opiate/Opioid/Benzo Prescription Compliance    Collection Time: 08/05/24 12:02 PM   Result Value Ref Range    Clonazepam <25 <25 ng/mL    7-Aminoclonazepam <25 <25 ng/mL    Alprazolam <25 <25 ng/mL    Alpha-Hydroxyalprazolam <25 <25 ng/mL    Midazolam <25 <25 ng/mL    Alpha-Hydroxymidazolam <25 <25 ng/mL    Chlordiazepoxide <25 <25 ng/mL    Diazepam <25 <25 ng/mL    Nordiazepam <25 <25 ng/mL    Temazepam <25 <25 ng/mL    Oxazepam <25 <25 ng/mL    Lorazepam 972 (H) <25 ng/mL    Methadone <25 <25 ng/mL    EDDP <25 <25 ng/mL    6-Acetylmorphine <25 <25 ng/mL    Codeine <50 <50 ng/mL    Hydrocodone <25 <25 ng/mL    Hydromorphone <25 <25 ng/mL    Morphine  <50 <50 ng/mL    Norhydrocodone <25 <25 ng/mL    Noroxycodone <25 <25 ng/mL    Oxycodone <25 <25 ng/mL    Oxymorphone <25 <25 ng/mL    Fentanyl <2.5 <2.5 ng/mL    Norfentanyl <2.5 <2.5 ng/mL    Tramadol <50 <50 ng/mL    O-Desmethyltramadol <50 <50 ng/mL     Zolpidem <25 <25 ng/mL    Zolpidem Metabolite (ZCA) <25 <25 ng/mL   Screen Opiate/Opioid/Benzo Prescription Compliance    Collection Time: 24 12:02 PM   Result Value Ref Range    Creatinine, Urine Random 112.7 20.0 - 320.0 mg/dL    Amphetamine Screen, Urine Presumptive Negative Presumptive Negative    Barbiturate Screen, Urine Presumptive Negative Presumptive Negative    Cannabinoid Screen, Urine Presumptive Negative Presumptive Negative    Cocaine Metabolite Screen, Urine Presumptive Negative Presumptive Negative    PCP Screen, Urine Presumptive Negative Presumptive Negative     Results unexpected. Results as expected    Controlled Substance Agreement:  Date of the Last Agreement: 23 reviewed 24  I have reviewed each line item on the Controlled Substance Agreement including, but not limited to, the benefits, risks, and alternatives to treatment with a Controlled Substance medication(s). The patient has verbalized understanding and signed the agreement.    Benzodiazepines:  What is the patient's goal of therapy? Anxiety sleep  Is this being achieved with current treatment? yes    BRIANNE-7:  Over the last 2 weeks, how often have you been bothered by any of the following problems?  Feeling nervous, anxious, or on edge: 2  Not being able to stop or control worryin  Worrying too much about different things: 2  Trouble relaxin  Being so restless that it is hard to sit still: 1  Becoming easily annoyed or irritable: 2  Feeling afraid as if something awful might happen: 1  BRIANNE-7 Total Score: 11    Father recently passed. Increased usage, especially HS     Activities of Daily Living:   Is your overall impression that this patient is benefiting (symptom reduction outweighs side effects) from benzodiazepine therapy? Yes     1. Physical Functioning: Same  2. Family Relationship: Same  3. Social Relationship: Same  4. Mood: Same  5. Sleep Patterns: Same  6. Overall Function: Same    Gen: Alert,  NAD  Respiratory:  resp effort NL, speaking in complete sentences   Neuro:  coordination intact   Mood: normal    Sitting upright

## 2024-11-27 DIAGNOSIS — F51.04 CHRONIC INSOMNIA: ICD-10-CM

## 2024-11-27 DIAGNOSIS — F32.A ANXIETY AND DEPRESSION: ICD-10-CM

## 2024-11-27 DIAGNOSIS — F41.9 ANXIETY AND DEPRESSION: ICD-10-CM

## 2024-11-27 RX ORDER — LORAZEPAM 1 MG/1
1 TABLET ORAL EVERY 8 HOURS PRN
Qty: 90 TABLET | Refills: 0 | Status: SHIPPED | OUTPATIENT
Start: 2024-11-27

## 2025-01-07 VITALS — BODY MASS INDEX: 24.63 KG/M2 | WEIGHT: 168 LBS

## 2025-01-07 DIAGNOSIS — F51.04 CHRONIC INSOMNIA: ICD-10-CM

## 2025-01-07 DIAGNOSIS — F41.9 ANXIETY AND DEPRESSION: ICD-10-CM

## 2025-01-07 DIAGNOSIS — F32.A ANXIETY AND DEPRESSION: ICD-10-CM

## 2025-01-07 RX ORDER — LORAZEPAM 1 MG/1
1 TABLET ORAL EVERY 8 HOURS PRN
Qty: 90 TABLET | Refills: 0 | Status: SHIPPED | OUTPATIENT
Start: 2025-01-07

## 2025-02-10 ENCOUNTER — TELEMEDICINE (OUTPATIENT)
Dept: PRIMARY CARE | Facility: CLINIC | Age: 40
End: 2025-02-10
Payer: COMMERCIAL

## 2025-02-10 DIAGNOSIS — F51.04 CHRONIC INSOMNIA: ICD-10-CM

## 2025-02-10 DIAGNOSIS — F41.9 ANXIETY AND DEPRESSION: ICD-10-CM

## 2025-02-10 DIAGNOSIS — F32.A ANXIETY AND DEPRESSION: ICD-10-CM

## 2025-02-10 DIAGNOSIS — Z79.899 MEDICATION MANAGEMENT: ICD-10-CM

## 2025-02-10 DIAGNOSIS — F41.9 ANXIETY AND DEPRESSION: Primary | ICD-10-CM

## 2025-02-10 DIAGNOSIS — F32.A ANXIETY AND DEPRESSION: Primary | ICD-10-CM

## 2025-02-10 PROCEDURE — 1036F TOBACCO NON-USER: CPT | Performed by: NURSE PRACTITIONER

## 2025-02-10 PROCEDURE — 99213 OFFICE O/P EST LOW 20 MIN: CPT | Performed by: NURSE PRACTITIONER

## 2025-02-10 RX ORDER — LORAZEPAM 1 MG/1
1 TABLET ORAL EVERY 8 HOURS PRN
Qty: 90 TABLET | Refills: 2 | Status: SHIPPED | OUTPATIENT
Start: 2025-02-10

## 2025-02-10 ASSESSMENT — ANXIETY QUESTIONNAIRES
3. WORRYING TOO MUCH ABOUT DIFFERENT THINGS: NEARLY EVERY DAY
5. BEING SO RESTLESS THAT IT IS HARD TO SIT STILL: SEVERAL DAYS
7. FEELING AFRAID AS IF SOMETHING AWFUL MIGHT HAPPEN: SEVERAL DAYS
1. FEELING NERVOUS, ANXIOUS, OR ON EDGE: MORE THAN HALF THE DAYS
IF YOU CHECKED OFF ANY PROBLEMS ON THIS QUESTIONNAIRE, HOW DIFFICULT HAVE THESE PROBLEMS MADE IT FOR YOU TO DO YOUR WORK, TAKE CARE OF THINGS AT HOME, OR GET ALONG WITH OTHER PEOPLE: SOMEWHAT DIFFICULT
4. TROUBLE RELAXING: SEVERAL DAYS
GAD7 TOTAL SCORE: 12
2. NOT BEING ABLE TO STOP OR CONTROL WORRYING: MORE THAN HALF THE DAYS
6. BECOMING EASILY ANNOYED OR IRRITABLE: MORE THAN HALF THE DAYS

## 2025-02-10 NOTE — TELEPHONE ENCOUNTER
Left vm for patient to call the office to schedule   Can offer top of the hour Monday afternoon on the 17th??

## 2025-02-10 NOTE — PROGRESS NOTES
An interactive audio/visual  telecommunication system which permits real time communications between the patient (at the originating site) and provider (at the distant site) was utilized to provide this telehealth service.    Verbal consent was requested and obtained from the patient for this telehealth visit.  We have confirmed the patient wishes to see me, Christie Prather, a board certified nurse practitioner with an active [place] advanced practice license as well as verified the patient's identity and physical location in [place].        Problem List Items Addressed This Visit          Medium    Anxiety and depression - Primary    Overview     Started 6/2021 after dx with MS  Genesight testing 8/21: Zoloft causes moderate gene-drug interactions, Wellbutrin and Selegiline cause severe gene-drug interactions  Lexapro caused increased appetite  Prozac caused  burning mouth at 20mg  On Cymbalta  Using Ativan 3x/day  11/20/24: father recently passed. Short term increase to 1 mg am, 1 mg afternoon, 2 mg HS  11/27/24: $$ for 4 tabs a day; back to 3 tabs          Medication management    Overview     UDS, signed controlled substance contract, and OARRS check all up to date  Q3m  10/27/23: no longer using MM;  compliance guidelines discussed              Controlled Substance Visit:  I have personally reviewed the OARRS report and have considered the risks of abuse, dependence, addiction and diversion and I believe that it is clinically appropriate for the patient to be prescribed this medication.    Is the patient prescribed a combination of a benzodiazepine and opioid?  No    Last Urine Drug Screen / ordered today: No  Recent Results (from the past 8760 hours)   Confirmation Opiate/Opioid/Benzo Prescription Compliance    Collection Time: 08/05/24 12:02 PM   Result Value Ref Range    Clonazepam <25 <25 ng/mL    7-Aminoclonazepam <25 <25 ng/mL    Alprazolam <25 <25 ng/mL    Alpha-Hydroxyalprazolam <25 <25 ng/mL    Midazolam <25  <25 ng/mL    Alpha-Hydroxymidazolam <25 <25 ng/mL    Chlordiazepoxide <25 <25 ng/mL    Diazepam <25 <25 ng/mL    Nordiazepam <25 <25 ng/mL    Temazepam <25 <25 ng/mL    Oxazepam <25 <25 ng/mL    Lorazepam 972 (H) <25 ng/mL    Methadone <25 <25 ng/mL    EDDP <25 <25 ng/mL    6-Acetylmorphine <25 <25 ng/mL    Codeine <50 <50 ng/mL    Hydrocodone <25 <25 ng/mL    Hydromorphone <25 <25 ng/mL    Morphine  <50 <50 ng/mL    Norhydrocodone <25 <25 ng/mL    Noroxycodone <25 <25 ng/mL    Oxycodone <25 <25 ng/mL    Oxymorphone <25 <25 ng/mL    Fentanyl <2.5 <2.5 ng/mL    Norfentanyl <2.5 <2.5 ng/mL    Tramadol <50 <50 ng/mL    O-Desmethyltramadol <50 <50 ng/mL    Zolpidem <25 <25 ng/mL    Zolpidem Metabolite (ZCA) <25 <25 ng/mL   Screen Opiate/Opioid/Benzo Prescription Compliance    Collection Time: 24 12:02 PM   Result Value Ref Range    Creatinine, Urine Random 112.7 20.0 - 320.0 mg/dL    Amphetamine Screen, Urine Presumptive Negative Presumptive Negative    Barbiturate Screen, Urine Presumptive Negative Presumptive Negative    Cannabinoid Screen, Urine Presumptive Negative Presumptive Negative    Cocaine Metabolite Screen, Urine Presumptive Negative Presumptive Negative    PCP Screen, Urine Presumptive Negative Presumptive Negative     Results are as expected.     Controlled Substance Agreement:  Date of the Last Agreement: 23 reviewed 24  I have reviewed each line item on the Controlled Substance Agreement including, but not limited to, the benefits, risks, and alternatives to treatment with a Controlled Substance medication(s). The patient has verbalized understanding and signed the agreement.    Benzodiazepines:  What is the patient's goal of therapy? anxiety  Is this being achieved with current treatment? yes    BRIANNE-7:  Over the last 2 weeks, how often have you been bothered by any of the following problems?  Feeling nervous, anxious, or on edge: 2  Not being able to stop or control worryin  Worrying  too much about different things: 3  Trouble relaxin  Being so restless that it is hard to sit still: 1  Becoming easily annoyed or irritable: 2  Feeling afraid as if something awful might happen: 1  BRIANNE-7 Total Score: 12        Activities of Daily Living:   Is your overall impression that this patient is benefiting (symptom reduction outweighs side effects) from benzodiazepine therapy? Yes     1. Physical Functioning: Same  2. Family Relationship: Same  3. Social Relationship: Same  4. Mood: Same  5. Sleep Patterns: Same  6. Overall Function: Same      Gen: Alert, NAD  Respiratory:  resp effort NL, speaking in complete sentences   Neuro:  coordination intact   Mood: normal    Sitting upright

## 2025-02-11 NOTE — PROGRESS NOTES
Left vm for patient to call the office to schedule CPE and 90 day CSV  CSV can be VV    FYI clerical - Available CPE slots with Dr. Cha:  5/19 @ 4p  6/9 @1030a  7/7 @ 4p  7/15 @3p  7/29 @ 830a  8/1 @ 4p  8/8 @ 1130a  8/11 @ 4p

## 2025-03-06 NOTE — PROGRESS NOTES
History: Andreina is here for her right knee. She has pain with weight bearing. She does not recall any injury. She hears grinding and popping with walking.  She did have her back pain flare a few weeks back which may be causing her to walk differently as well.  This has now started to resolve.     Past medical history: Multiple  Medications: Multiple  Allergies: No known drug allergies    Please refer to the intake H&P regarding the patient's review of systems, family history and social history as was done today    HEENT: Normal  Lungs: Clear to auscultation  Heart: RRR  Abdomen: Soft, nontender  Skin: clear  Extremity: She has 1+ patellar glide and tilt.  There is pain around the patella with ballottement.  There is 1+ crepitus with range of motion.  No pain medially or laterally.  Mild valgus alignment.  No numbness or tingling.  Contralateral exam is normal for strength, motion, stability and neurovascular assessment.    Radiographs: X-rays show slight patellar lateralization with mild patellar and medial joint space wear.    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 She would like to proceed. She was prescribed meloxicam and provided a [employer] brace and physical therapy note today for the patellofemoral syndrome protocol with VMO strengthening. Follow-up in 6 weeks if not improving to consider imaging or injection trials.    All questions were answered today with the patient.    This note was generated with voice recognition software and may contain grammatical errors.    Scribe Attestation  By signing my name below, [place]aver[place], Natalia   attest that this documentation has been prepared under the direction and in the presence of Jim Summers MD.

## 2025-03-07 ENCOUNTER — OFFICE VISIT (OUTPATIENT)
Dept: ORTHOPEDIC SURGERY | Facility: CLINIC | Age: 40
End: 2025-03-07
Payer: COMMERCIAL

## 2025-03-07 ENCOUNTER — HOSPITAL ENCOUNTER (OUTPATIENT)
Dept: RADIOLOGY | Facility: CLINIC | Age: 40
Discharge: HOME | End: 2025-03-07
Payer: COMMERCIAL

## 2025-03-07 DIAGNOSIS — M25.561 ACUTE PAIN OF RIGHT KNEE: ICD-10-CM

## 2025-03-07 DIAGNOSIS — M17.11 OSTEOARTHRITIS OF RIGHT KNEE, UNSPECIFIED OSTEOARTHRITIS TYPE: ICD-10-CM

## 2025-03-07 DIAGNOSIS — M25.561 ACUTE PAIN OF RIGHT KNEE: Primary | ICD-10-CM

## 2025-03-07 PROCEDURE — 73564 X-RAY EXAM KNEE 4 OR MORE: CPT | Mod: RT

## 2025-03-07 PROCEDURE — 99213 OFFICE O/P EST LOW 20 MIN: CPT | Performed by: ORTHOPAEDIC SURGERY

## 2025-03-07 RX ORDER — MELOXICAM 15 MG/1
15 TABLET ORAL DAILY
Qty: 30 TABLET | Refills: 2 | Status: SHIPPED | OUTPATIENT
Start: 2025-03-07 | End: 2025-06-05

## 2025-03-19 ENCOUNTER — APPOINTMENT (OUTPATIENT)
Dept: ORTHOPEDIC SURGERY | Facility: HOSPITAL | Age: 40
End: 2025-03-19
Payer: COMMERCIAL

## 2025-05-07 ENCOUNTER — APPOINTMENT (OUTPATIENT)
Dept: PRIMARY CARE | Facility: CLINIC | Age: 40
End: 2025-05-07
Payer: COMMERCIAL

## 2025-05-14 ASSESSMENT — PROMIS GLOBAL HEALTH SCALE
RATE_AVERAGE_FATIGUE: MODERATE
RATE_PHYSICAL_HEALTH: GOOD
RATE_QUALITY_OF_LIFE: VERY GOOD
RATE_GENERAL_HEALTH: GOOD
CARRYOUT_SOCIAL_ACTIVITIES: GOOD
RATE_SOCIAL_SATISFACTION: GOOD
RATE_MENTAL_HEALTH: GOOD
CARRYOUT_PHYSICAL_ACTIVITIES: COMPLETELY
RATE_AVERAGE_PAIN: 4
EMOTIONAL_PROBLEMS: OFTEN

## 2025-05-19 ENCOUNTER — APPOINTMENT (OUTPATIENT)
Dept: PRIMARY CARE | Facility: CLINIC | Age: 40
End: 2025-05-19
Payer: COMMERCIAL

## 2025-05-19 VITALS
BODY MASS INDEX: 22.93 KG/M2 | HEART RATE: 84 BPM | WEIGHT: 154.8 LBS | TEMPERATURE: 96.3 F | SYSTOLIC BLOOD PRESSURE: 98 MMHG | HEIGHT: 69 IN | OXYGEN SATURATION: 99 % | DIASTOLIC BLOOD PRESSURE: 65 MMHG

## 2025-05-19 DIAGNOSIS — Z12.31 ENCOUNTER FOR SCREENING MAMMOGRAM FOR BREAST CANCER: ICD-10-CM

## 2025-05-19 DIAGNOSIS — J45.20 MILD INTERMITTENT ASTHMA, UNSPECIFIED WHETHER COMPLICATED (HHS-HCC): ICD-10-CM

## 2025-05-19 DIAGNOSIS — G35 MULTIPLE SCLEROSIS (MULTI): ICD-10-CM

## 2025-05-19 DIAGNOSIS — R41.89 SIGNS AND SYMPTOMS INVOLVING COGNITION: ICD-10-CM

## 2025-05-19 DIAGNOSIS — F32.A ANXIETY AND DEPRESSION: ICD-10-CM

## 2025-05-19 DIAGNOSIS — N83.209 CYST OF OVARY, UNSPECIFIED LATERALITY: ICD-10-CM

## 2025-05-19 DIAGNOSIS — J30.2 SEASONAL ALLERGIES: ICD-10-CM

## 2025-05-19 DIAGNOSIS — E55.9 VITAMIN D DEFICIENCY: ICD-10-CM

## 2025-05-19 DIAGNOSIS — Z79.899 MEDICATION MANAGEMENT: ICD-10-CM

## 2025-05-19 DIAGNOSIS — F41.9 ANXIETY AND DEPRESSION: ICD-10-CM

## 2025-05-19 DIAGNOSIS — Z86.39 H/O: OBESITY: ICD-10-CM

## 2025-05-19 DIAGNOSIS — E66.3 OVERWEIGHT WITH BODY MASS INDEX (BMI) OF 26 TO 26.9 IN ADULT: ICD-10-CM

## 2025-05-19 DIAGNOSIS — F51.04 CHRONIC INSOMNIA: ICD-10-CM

## 2025-05-19 DIAGNOSIS — Z00.00 ROUTINE ADULT HEALTH MAINTENANCE: Primary | Chronic | ICD-10-CM

## 2025-05-19 DIAGNOSIS — R93.89 ENDOMETRIAL THICKENING ON ULTRASOUND: ICD-10-CM

## 2025-05-19 PROBLEM — L25.9 CONTACT DERMATITIS: Status: RESOLVED | Noted: 2023-01-30 | Resolved: 2025-05-19

## 2025-05-19 PROCEDURE — 1036F TOBACCO NON-USER: CPT | Performed by: INTERNAL MEDICINE

## 2025-05-19 PROCEDURE — 3008F BODY MASS INDEX DOCD: CPT | Performed by: INTERNAL MEDICINE

## 2025-05-19 PROCEDURE — 99395 PREV VISIT EST AGE 18-39: CPT | Performed by: INTERNAL MEDICINE

## 2025-05-19 PROCEDURE — 99214 OFFICE O/P EST MOD 30 MIN: CPT | Performed by: INTERNAL MEDICINE

## 2025-05-19 RX ORDER — DULOXETINE 40 MG/1
40 CAPSULE, DELAYED RELEASE ORAL DAILY
Qty: 90 CAPSULE | Refills: 3 | Status: SHIPPED | OUTPATIENT
Start: 2025-05-19

## 2025-05-19 RX ORDER — LORAZEPAM 1 MG/1
1 TABLET ORAL EVERY 8 HOURS PRN
Qty: 90 TABLET | Refills: 2 | Status: SHIPPED | OUTPATIENT
Start: 2025-05-19

## 2025-05-19 RX ORDER — SEMAGLUTIDE 2.4 MG/.75ML
2.4 INJECTION, SOLUTION SUBCUTANEOUS WEEKLY
Qty: 3 ML | Refills: 11 | Status: SHIPPED | OUTPATIENT
Start: 2025-05-19 | End: 2026-05-19

## 2025-05-19 RX ORDER — HYDROXYZINE HYDROCHLORIDE 25 MG/1
25 TABLET, FILM COATED ORAL NIGHTLY
Qty: 90 TABLET | Refills: 3 | Status: SHIPPED | OUTPATIENT
Start: 2025-05-19 | End: 2026-05-19

## 2025-05-19 RX ORDER — ALBUTEROL SULFATE 90 UG/1
2 INHALANT RESPIRATORY (INHALATION) EVERY 6 HOURS PRN
Qty: 18 G | Refills: 3 | Status: SHIPPED | OUTPATIENT
Start: 2025-05-19

## 2025-05-19 ASSESSMENT — PATIENT HEALTH QUESTIONNAIRE - PHQ9
1. LITTLE INTEREST OR PLEASURE IN DOING THINGS: NOT AT ALL
10. IF YOU CHECKED OFF ANY PROBLEMS, HOW DIFFICULT HAVE THESE PROBLEMS MADE IT FOR YOU TO DO YOUR WORK, TAKE CARE OF THINGS AT HOME, OR GET ALONG WITH OTHER PEOPLE: SOMEWHAT DIFFICULT
1. LITTLE INTEREST OR PLEASURE IN DOING THINGS: NOT AT ALL
SUM OF ALL RESPONSES TO PHQ9 QUESTIONS 1 AND 2: 1
2. FEELING DOWN, DEPRESSED OR HOPELESS: SEVERAL DAYS
SUM OF ALL RESPONSES TO PHQ9 QUESTIONS 1 AND 2: 1
2. FEELING DOWN, DEPRESSED OR HOPELESS: SEVERAL DAYS

## 2025-05-19 ASSESSMENT — ANXIETY QUESTIONNAIRES
2. NOT BEING ABLE TO STOP OR CONTROL WORRYING: MORE THAN HALF THE DAYS
4. TROUBLE RELAXING: SEVERAL DAYS
3. WORRYING TOO MUCH ABOUT DIFFERENT THINGS: NEARLY EVERY DAY
5. BEING SO RESTLESS THAT IT IS HARD TO SIT STILL: SEVERAL DAYS
GAD7 TOTAL SCORE: 12
1. FEELING NERVOUS, ANXIOUS, OR ON EDGE: MORE THAN HALF THE DAYS
6. BECOMING EASILY ANNOYED OR IRRITABLE: MORE THAN HALF THE DAYS
7. FEELING AFRAID AS IF SOMETHING AWFUL MIGHT HAPPEN: SEVERAL DAYS
IF YOU CHECKED OFF ANY PROBLEMS ON THIS QUESTIONNAIRE, HOW DIFFICULT HAVE THESE PROBLEMS MADE IT FOR YOU TO DO YOUR WORK, TAKE CARE OF THINGS AT HOME, OR GET ALONG WITH OTHER PEOPLE: SOMEWHAT DIFFICULT

## 2025-05-19 NOTE — PROGRESS NOTES
ANNUAL CPE VISIT  Chief Complaint   Patient presents with    Annual Exam     CSV     HPI: Reviewed chart/medical care received since last seen by me.    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   The endometrial thickness is 9.3 mm.   The myometrium is heterogenous and suggestive of adenomyosis.  The right ovary measures 19 mm x 12 mm x 12 mm. Physiological in appearance.  The left ovary measures 28 mm x 24 mm x 24 mm.   There is a unilocular cyst measuring 20 x 15 x 14 mm with a daughter cyst present within. ORADS-2.  There is no free fluid visualized.     Has had periods since this scan    Saw [facility]  C(opied)  ASSESSMENT/PLAN:  Andreina Gil is a 39 year old female with Multiple Sclerosis. Continues to tolerate Ocrevus well. Newly pregnant, 6w5d. Has follow up US scheduled for next week and Ocrevus scheduled for . Wants to hold off on cancelling Ocrevus until after next US given concerns for possible miscarriage. She understands that she  should not receive infusion if she is pregnant; will hold off on signing orders. Discussed planning for follow up visit later in pregnancy closer to delivery date to discuss breastfeeding plans and DMT resumption plans. Likely will resume immune therapy in the few weeks following delivery. Has hx of relapse in 3rd trimester, so will monitor neurological symptoms during pregnancy closely. She understands to notify us of new or concerning neurological symptoms.   PLAN:  - hold Ocrevus during pregnancy  - hold MRIs during pregnancy      Assessment and Plan:  Problem List Items Addressed This Visit          High    Routine adult health maintenance - Primary (Chronic)    Overview   COVID-19 vaccine 3/10/2021, 2/17/2021, 9/24/21, 6/2/22, 12/18/22  Hepatitis B Peds/Adol12/29/1997, 5/16/1997, 4/4/1997  Influenza Vaccine 10/3/2018  MMR4/4/1997  Shingrix 12/18/22  Tdap (Age 7+)1/22/2019, 6/1/2012  Varicella Vaccine1/8/1998  PAP/HPV 4/11/17: LGSIL/HPV(+), 7/31/18: normal PAP, HPV(+),  8/19/19 LGSIL/HPV(+),  9/28/20: ASCUS, HPV (-); 4/17/2024 pap/HPV (-)  8/11/21 (-)  Hep C Ab (-) 6/21  BCG8/24/2011         Current Assessment & Plan   Annual Wellness exam completed   Preventive Health History reviewed  Ordered:   Labs    Mammogram   ? Need for 2nd shingrix         Relevant Orders    Comprehensive Metabolic Panel    CBC and Auto Differential    Lipid Panel    Urinalysis with Reflex Microscopic       Medium    Anxiety and depression    Overview   Started 6/2021 after dx with MS  Genesight testing 8/21: Zoloft causes moderate gene-drug interactions, Wellbutrin and Selegiline cause severe gene-drug interactions  Focalin and Ritalin/Concerta all cause mod gene-drug interactions  Lexapro caused increased appetite  Prozac caused  burning mouth at 20mg  On Cymbalta  Using Ativan 3x/day prn  11/20/24: father recently passed. Short term increase to 1 mg am, 1 mg afternoon, 2 mg HS  11/27/24: $$ for 4 tabs a day; back to 3 tabs           Current Assessment & Plan   Will get psychiatry consult  Try to reduce ativan use at HS, not sure if it is the underlying cause for some of her cognitive sx         Relevant Medications    DULoxetine 40 mg DR capsule    LORazepam (Ativan) 1 mg tablet    hydrOXYzine HCL (Atarax) 25 mg tablet    Other Relevant Orders    TSH with reflex to Free T4 if abnormal    Referral to [facility]    Chronic insomnia    Overview   Ambien didnt work  Unisom helps for few hours  IV Benadryl worked  Trazodone may be interacting with her SSRI, stopped 9/21 (On Cymbalta now)  Using Ativan 2mg at HS         Current Assessment & Plan   Try Atarax in place of Unisom and Ativan           Relevant Medications    LORazepam (Ativan) 1 mg tablet    hydrOXYzine HCL (Atarax) 25 mg tablet    Other Relevant Orders    Referral to [facility]    Encounter for screening mammogram for breast cancer    Relevant Orders    BI mammo bilateral screening tomosynthesis    Endometrial thickening on ultrasound    Overview   4/2024: pap/HPV neg   4/2024 US: Indeterminate 2.2 cm x 2.4 cm left ovarian isoechoic lesion  containing homogeneous low level echoes. This may represent a  hemorrhagic cyst, however endometrium a similar appearance.  1.6 cm for circumscribed lesion of the endometrium which is  suspicious for polyp. Further gynecologic workup is advised. Mild  endometrial thickening.  US 11/24: The endometrial thickness is 9.3 mm. The myometrium is heterogenous and suggestive of  adenomyosis.  The right ovary measures 19 mm x 12 mm x 12 mm. Physiological in appearance.  The left ovary measures 28 mm x 24 mm x 24 mm. There is a unilocular cyst  measuring 20 x 15 x 14 mm with a daughter cyst present within. ORADS-2          Current Assessment & Plan   Repeat US         Relevant Orders    US PELVIS TRANSABDOMINAL WITH TRANSVAGINAL    H/O: obesity    Overview   Discussed exercise/PT and diet  MS provider cleared her for  semaglutide  The GLP-1 receptor agonist may also be effective in reducing leukocyte invasion into tissues of the CNS and reducing the destruction of the underlying neuronal tissues of the CNS, and thus ameliorating the clinical manifestations of multiple sclerosis   No hx pancreatitis, personal/family hx MTC  9/29/23: started wegovy  (supply issues caused us to use ozempic samples to bridge therapy)  Start was 204lbs           Relevant Medications    semaglutide, weight loss, (Wegovy) 2.4 mg/0.75 mL pen injector    Medication management    Overview   UDS, signed controlled substance contract, and OARRS check all up to date  Q3m  10/27/23: no longer using MM;  compliance guidelines discussed          Relevant Orders    Opiate/Opioid/Benzo Prescription Compliance    Mild intermittent asthma    Overview   Weather induced         Multiple sclerosis (Multi)    Overview   dx 6/2021   Baclofen prn and Ativan for muscle spasms and dizziness associated with her disease  On Ocrevus, started in August 2021.  [facility] manages  Started GLP1 med 'off label' in 9/23 (thought to be beneficial for MS): The GLP-1 receptor agonist may also be effective in reducing leukocyte invasion into tissues of the CNS and reducing the destruction of the underlying neuronal tissues of the CNS, and thus ameliorating the clinical manifestations of multiple sclerosis. Aware of R&B of this.         Ovarian cyst    Overview   4/2024 US: Indeterminate 2.2 cm x 2.4 cm left ovarian isoechoic lesion  containing homogeneous low level echoes. This may represent a  hemorrhagic cyst, however endometrium a similar appearance.  1.6 cm for circumscribed lesion of the endometrium which is  suspicious for polyp. Further gynecologic workup is advised. Mild  endometrial thickening.  US 11/24: The endometrial thickness is 9.3 mm. The myometrium is heterogenous and suggestive of adenomyosis.  The right ovary measures 19 mm x 12 mm x 12 mm. Physiological in  "appearance.  The left ovary measures 28 mm x 24 mm x 24 mm. There is a unilocular cyst  measuring 20 x 15 x 14 mm with a daughter cyst present within. ORADS-2          Current Assessment & Plan   US to assess          Seasonal allergies    Relevant Medications    albuterol 90 mcg/actuation inhaler    Vitamin D deficiency    Overview   Goal ~50  On Vit D 6K daily, prefers to take weekly bc forgets daily         Relevant Orders    Vitamin D 25-Hydroxy,Total (for eval of Vitamin D levels)    Vitamin B12     Other Visit Diagnoses         Signs and symptoms involving cognition        ? due to ADD  ? due to anxiety  ? due to benzo AE  Will get psychiatry consult to help with dx    Relevant Orders    TSH with reflex to Free T4 if abnormal    Vitamin B12    Referral to [facility]    Folate              ROS otherwise negative aside from what was mentioned above in HPI.    Vitals  BP 98/65   Pulse 84   Temp 35.7 °C (96.3 °F)   Ht 1.759 m (5' 9.25\")   Wt 70.2 kg (154 lb 12.8 oz)   SpO2 99%   BMI 22.70 kg/m²   Body mass index is 22.7 kg/m².  Physical Exam  Gen: Alert, NAD  HEENT:  Normal exam  Neck:  No masses/nodes palpable; Thyroid nontender and without nodules; No ERASMO  Respiratory:  Lungs CTAB  CV: RRR  Neuro:  Gross motor and sensory intact  Skin:  No suspicious lesions present  Breast: No masses or axillary lymphadenopathy      Allergies and Medications  Cat dander, Escitalopram oxalate, House dust, and Sertraline  Current Outpatient Medications   Medication Instructions    albuterol 90 mcg/actuation inhaler 2 puffs, inhalation, Every 6 hours PRN, prn    cholecalciferol (VITAMIN D-3) 50,000 Units, oral, Once Weekly    DULoxetine 40 mg, oral, Daily    hydrOXYzine HCL (ATARAX) 25 mg, oral, Nightly    ibuprofen 600 mg, Every 6 hours PRN    LORazepam (ATIVAN) 1 mg, oral, Every 8 hours PRN    Ocrevus 300 mg    Wegovy 2.4 mg, subcutaneous, Weekly       Active Problem List  Problem " List[1]    Comprehensive Medical/Surgical/Social/Family History  Medical History[2]  Surgical History[3]    Social History     Social History Narrative    Social History:    , 2 kids    Works as Mental Health Therapist part-time    Nonsmoker    Social ETOH        Family History:    M: HTN, Emphysema, trigeminal neuralgia, osteopenia    F: Bipolar, OCD, Anxiety/Depression, HTN, recovered ETOH, bladder CA, cataracts, colon stricture/resection, stomach aneurysm, osteoporosis, Pancreatic CA ( )    S: Depression, Anxiety, HTN, migraine    S: Depression, Anxiety, OCD, HTN, ETOH, Macular Degeneration, Anemia, osteopenia, asthma, migraine    PGF: CHF    PGGM: Parkinson's    PGA: Parkinson's    PGM: angina, alzhemiers, colon CA, RA, skin CA, depression    P1/2U: brain tumor ()    PU: migraine    MGGF: RA    MGM: T2D, HTN, COPD, CHF, stroke, migraine, kidney disease    Son: Hearing/Eye issues     Controlled Substance Visit:  I have personally reviewed the OARRS report and have considered the risks of abuse, dependence, addiction and diversion and I believe that it is clinically appropriate for the patient to be prescribed this medication.    Is the patient prescribed a combination of a benzodiazepine and opioid? no     The last Urine Drug Screen has been reviewed and results are as expected,  and/or the UDS was ordered today if due.  Recent Results (from the past 8760 hours)   Confirmation Opiate/Opioid/Benzo Prescription Compliance    Collection Time: 24 12:02 PM   Result Value Ref Range    Clonazepam <25 <25 ng/mL    7-Aminoclonazepam <25 <25 ng/mL    Alprazolam <25 <25 ng/mL    Alpha-Hydroxyalprazolam <25 <25 ng/mL    Midazolam <25 <25 ng/mL    Alpha-Hydroxymidazolam <25 <25 ng/mL    Chlordiazepoxide <25 <25 ng/mL    Diazepam <25 <25 ng/mL    Nordiazepam <25 <25 ng/mL    Temazepam <25 <25 ng/mL    Oxazepam <25 <25 ng/mL    Lorazepam 972 (H) <25 ng/mL    Methadone <25 <25 ng/mL    EDDP <25 <25 ng/mL     6-Acetylmorphine <25 <25 ng/mL    Codeine <50 <50 ng/mL    Hydrocodone <25 <25 ng/mL    Hydromorphone <25 <25 ng/mL    Morphine  <50 <50 ng/mL    Norhydrocodone <25 <25 ng/mL    Noroxycodone <25 <25 ng/mL    Oxycodone <25 <25 ng/mL    Oxymorphone <25 <25 ng/mL    Fentanyl <2.5 <2.5 ng/mL    Norfentanyl <2.5 <2.5 ng/mL    Tramadol <50 <50 ng/mL    O-Desmethyltramadol <50 <50 ng/mL    Zolpidem <25 <25 ng/mL    Zolpidem Metabolite (ZCA) <25 <25 ng/mL   Screen Opiate/Opioid/Benzo Prescription Compliance    Collection Time: 24 12:02 PM   Result Value Ref Range    Creatinine, Urine Random 112.7 20.0 - 320.0 mg/dL    Amphetamine Screen, Urine Presumptive Negative Presumptive Negative    Barbiturate Screen, Urine Presumptive Negative Presumptive Negative    Cannabinoid Screen, Urine Presumptive Negative Presumptive Negative    Cocaine Metabolite Screen, Urine Presumptive Negative Presumptive Negative    PCP Screen, Urine Presumptive Negative Presumptive Negative       Controlled Substance Agreement Date 25  I have reviewed each line item on the Controlled Substance Agreement including, but not limited to, the benefits, risks, and alternatives to treatment with a Controlled Substance medication(s). The patient has verbalized understanding and signed the agreement.    Benzodiazepines:  What is the patient's goal of therapy? anxiety  Is this being achieved with current treatment? yes    BRIANNE-7:  Over the last 2 weeks, how often have you been bothered by any of the following problems?  Feeling nervous, anxious, or on edge: 2  Not being able to stop or control worryin  Worrying too much about different things: 3  Trouble relaxin  Being so restless that it is hard to sit still: 1  Becoming easily annoyed or irritable: 2  Feeling afraid as if something awful might happen: 1  BRIANNE-7 Total Score: 12        Activities of Daily Living:   Is your overall impression that this patient is benefiting (symptom reduction  outweighs side effects) from benzodiazepine therapy? Yes     1. Physical Functioning: Better  2. Family Relationship: Same  3. Social Relationship: Same  4. Mood: Better  5. Sleep Patterns: Better  6. Overall Function: Better         [1]   Patient Active Problem List  Diagnosis    Anxiety and depression    Asymptomatic microscopic hematuria    Blood pressure elevated without history of HTN    Cervical spine pain    Chronic insomnia    History of shingles    Lumbar spondylosis    Mild intermittent asthma    Multiple sclerosis (Multi)    PVC (premature ventricular contraction)    Seasonal allergies    Vitamin D deficiency    H/O: obesity    H/O abnormal cervical Papanicolaou smear    Medication management    Routine adult health maintenance    Ovarian cyst    Endometrial thickening on ultrasound    Encounter for screening mammogram for breast cancer   [2]   Past Medical History:  Diagnosis Date    H/O echocardiogram     2018: 1. Normal LV size and systolic function.  2. Normal RV size and systolic function.  3. Trivial TR and PI    H/O magnetic resonance imaging of brain and brain stem     6/21: MR/MRA/MRVI of the brain shows multiple nonspecific small/punctate T2/FLAIR hyperintense foci cerebral white matter, several of which are in the periventricular region, where they are oriented along the parapharyngeal spaces, as is commonly seen with (mild to moderate burden) a demyelinating process/possible multiple sclerosis    H/O magnetic resonance imaging of brain and brain stem 12/09/2023    Scattered intramedullary lesions compatible with the clinical history of multiple    H/O magnetic resonance imaging of cervical spine     6/21: Single small intramedullary signal abnormality at C5 level likely represents demyelinating plaque given intracranial findings. Remaining visualized cord demonstrates normal morhology and signal intensity.  No significant canal or foraminal stenosis.    H/O magnetic resonance imaging of  cervical spine 2024    Multiple intracranial white matter lesions compatible with multiple sclerosis.   No new T2 lesions and no new enhancing lesions.  No significant parenchymal volume loss.  Other Significant Intracranial Findings:  None  Scattered intramedullary lesions compatible with the clinical history of multiple sclerosis.  No new T2 intramedullary lesions and no new enhancing intramedullary lesions    H/O pelvic ultrasound 2024    1/2.Indeterminate 2.2 cm x 2.4 cm left ovarian isoechoic lesion containing homogeneous low level echoes. This may represent a hemorrhagic cyst, however endometrium a similar appearance.    H/O pelvic ultrasound 2024    2/2.1.6 cm for circumscribed lesion of the endometrium which is suspicious for polyp. Further gynecologic workup is advised. Mild endometrial thickening.    H/O pelvic ultrasound 2024    The endometrial thickness is 9.3 mm. The myometrium is heterogenous and suggestive of adenomyosis. The right ovary measures 19 mm x 12 mm x 12 mm. Physiological in appearance. The left ovary measures 28 mm x 24 mm x 24 mm. There is a unilocular cyst measuring 20 x 15 x 14 mm with a daughter cyst present within. ORADS-2    H/O x-ray of lumbar spine     : normal   [3]   Past Surgical History:  Procedure Laterality Date    CERVICAL BIOPSY  W/ LOOP ELECTRODE EXCISION       SECTION, CLASSIC      COLONOSCOPY      age 24 y, [place] ?    COLPOSCOPY      2017: FINAL DIAGNOSIS 1. Endocervix, curettage (A) - Minute strips of benign  endocervical epithelium. 2. Endocervix, 12 o'clock, biopsy (B) - Benign  squamous mucosa, negative for dysplasia. BY/[facility]/rw 2017    OTHER SURGICAL HISTORY  2021    Colonoscopy    OTHER SURGICAL HISTORY  2021    Loop electrosurgical excision procedure    OTHER SURGICAL HISTORY  2021    Tonsillectomy    OTHER SURGICAL HISTORY  2021    [place] tooth extraction    OTHER SURGICAL HISTORY  2021      section    OTHER SURGICAL HISTORY  2021    Colposcopy    TONSILLECTOMY      WISDOM TOOTH EXTRACTION

## 2025-05-19 NOTE — ASSESSMENT & PLAN NOTE
Annual Wellness exam completed   Preventive Health History reviewed  Ordered:   Labs    Mammogram   ? Need for 2nd shingrix

## 2025-05-19 NOTE — Clinical Note
Question for you... I think I know the answer, but double checking myself She missed her 2nd shingles vaccine She HAD shingles in August 2022, then 1st vaccine given on 12/18/22 at [facility] She is immunocompromised (MS, on Ocrevus) 2nd vaccine now, yes?

## 2025-05-19 NOTE — ASSESSMENT & PLAN NOTE
Will get psychiatry consult  Try to reduce ativan use at HS, not sure if it is the underlying cause for some of her cognitive sx

## 2025-05-21 ENCOUNTER — TELEPHONE (OUTPATIENT)
Dept: PRIMARY CARE | Facility: CLINIC | Age: 40
End: 2025-05-21
Payer: COMMERCIAL

## 2025-05-21 NOTE — TELEPHONE ENCOUNTER
----- Message from Pat Cha sent at 5/20/2025  5:14 AM EDT -----  Regarding: FW:  Can relay 2nd shingles can be done anytimeCan do as MA visit or she can get at pharmacyCan send order over to pharmacy if she chooses that  ----- Message -----  From: Deena Snow MD  Sent: 5/19/2025   6:41 PM EDT  To: Pat Cha MD  Subject: RE:                                              Yes would give now  ----- Message -----  From: Pat Cha MD  Sent: 5/19/2025   4:35 PM EDT  To: Deena Snow MD    Question for you... I think I know the answer, but double checking myself  She missed her 2nd shingles vaccine  She HAD shingles in August 2022, then 1st vaccine given on 12/18/22 at [facility]  She is immunocompromised (MS, on Ocrevus)  2nd vaccine now, yes?

## 2025-06-11 ENCOUNTER — HOSPITAL ENCOUNTER (OUTPATIENT)
Dept: RADIOLOGY | Facility: CLINIC | Age: 40
Discharge: HOME | End: 2025-06-11
Payer: COMMERCIAL

## 2025-06-11 DIAGNOSIS — R93.89 ENDOMETRIAL THICKENING ON ULTRASOUND: ICD-10-CM

## 2025-06-11 PROCEDURE — 76856 US EXAM PELVIC COMPLETE: CPT

## 2025-06-17 ENCOUNTER — APPOINTMENT (OUTPATIENT)
Dept: PRIMARY CARE | Facility: CLINIC | Age: 40
End: 2025-06-17
Payer: COMMERCIAL

## 2025-06-18 ENCOUNTER — HOSPITAL ENCOUNTER (OUTPATIENT)
Dept: RADIOLOGY | Facility: CLINIC | Age: 40
End: 2025-06-18
Payer: COMMERCIAL

## 2025-06-19 ENCOUNTER — TELEPHONE (OUTPATIENT)
Dept: PRIMARY CARE | Facility: CLINIC | Age: 40
End: 2025-06-19
Payer: COMMERCIAL

## 2025-06-23 ENCOUNTER — HOSPITAL ENCOUNTER (OUTPATIENT)
Dept: RADIOLOGY | Facility: CLINIC | Age: 40
Discharge: HOME | End: 2025-06-23
Payer: COMMERCIAL

## 2025-06-23 DIAGNOSIS — R93.89 ENDOMETRIAL THICKENING ON ULTRASOUND: ICD-10-CM

## 2025-06-23 DIAGNOSIS — R93.89 ABNORMAL ULTRASOUND OF PELVIS: ICD-10-CM

## 2025-06-23 PROCEDURE — 72195 MRI PELVIS W/O DYE: CPT

## 2025-06-30 ENCOUNTER — APPOINTMENT (OUTPATIENT)
Dept: RADIOLOGY | Facility: CLINIC | Age: 40
End: 2025-06-30
Payer: COMMERCIAL

## 2025-06-30 NOTE — TELEPHONE ENCOUNTER
Left VM asking patient to call office to discuss the Wegovy PA and get an updated weight/ side effects.

## 2025-07-31 ENCOUNTER — APPOINTMENT (OUTPATIENT)
Dept: RADIOLOGY | Facility: CLINIC | Age: 40
End: 2025-07-31
Payer: COMMERCIAL

## 2025-07-31 DIAGNOSIS — Z12.31 ENCOUNTER FOR SCREENING MAMMOGRAM FOR BREAST CANCER: ICD-10-CM

## 2025-07-31 PROCEDURE — 77063 BREAST TOMOSYNTHESIS BI: CPT

## 2025-08-20 ENCOUNTER — APPOINTMENT (OUTPATIENT)
Dept: PRIMARY CARE | Facility: CLINIC | Age: 40
End: 2025-08-20
Payer: COMMERCIAL

## 2025-08-20 DIAGNOSIS — Z79.899 LONG TERM PRESCRIPTION BENZODIAZEPINE USE: ICD-10-CM

## 2025-08-20 DIAGNOSIS — F32.A ANXIETY AND DEPRESSION: Primary | ICD-10-CM

## 2025-08-20 DIAGNOSIS — Z86.39 H/O: OBESITY: ICD-10-CM

## 2025-08-20 DIAGNOSIS — F41.9 ANXIETY AND DEPRESSION: Primary | ICD-10-CM

## 2025-08-20 DIAGNOSIS — Z79.899 MEDICATION MANAGEMENT: ICD-10-CM

## 2025-08-20 PROCEDURE — 99214 OFFICE O/P EST MOD 30 MIN: CPT | Performed by: NURSE PRACTITIONER

## 2025-08-20 ASSESSMENT — ANXIETY QUESTIONNAIRES
5. BEING SO RESTLESS THAT IT IS HARD TO SIT STILL: NOT AT ALL
4. TROUBLE RELAXING: SEVERAL DAYS
7. FEELING AFRAID AS IF SOMETHING AWFUL MIGHT HAPPEN: SEVERAL DAYS
3. WORRYING TOO MUCH ABOUT DIFFERENT THINGS: MORE THAN HALF THE DAYS
1. FEELING NERVOUS, ANXIOUS, OR ON EDGE: SEVERAL DAYS
GAD7 TOTAL SCORE: 9
IF YOU CHECKED OFF ANY PROBLEMS ON THIS QUESTIONNAIRE, HOW DIFFICULT HAVE THESE PROBLEMS MADE IT FOR YOU TO DO YOUR WORK, TAKE CARE OF THINGS AT HOME, OR GET ALONG WITH OTHER PEOPLE: SOMEWHAT DIFFICULT
2. NOT BEING ABLE TO STOP OR CONTROL WORRYING: SEVERAL DAYS
6. BECOMING EASILY ANNOYED OR IRRITABLE: NEARLY EVERY DAY

## 2025-08-20 ASSESSMENT — PATIENT HEALTH QUESTIONNAIRE - PHQ9
SUM OF ALL RESPONSES TO PHQ9 QUESTIONS 1 AND 2: 0
2. FEELING DOWN, DEPRESSED OR HOPELESS: NOT AT ALL
1. LITTLE INTEREST OR PLEASURE IN DOING THINGS: NOT AT ALL

## 2025-09-05 DIAGNOSIS — J30.2 SEASONAL ALLERGIES: ICD-10-CM

## 2025-09-05 RX ORDER — ALBUTEROL SULFATE 90 UG/1
2 INHALANT RESPIRATORY (INHALATION) EVERY 6 HOURS PRN
Qty: 18 G | Refills: 3 | Status: SHIPPED | OUTPATIENT
Start: 2025-09-05

## 2026-06-15 ENCOUNTER — APPOINTMENT (OUTPATIENT)
Dept: PRIMARY CARE | Facility: CLINIC | Age: 41
End: 2026-06-15
Payer: COMMERCIAL